# Patient Record
Sex: MALE | HISPANIC OR LATINO | Employment: FULL TIME | URBAN - METROPOLITAN AREA
[De-identification: names, ages, dates, MRNs, and addresses within clinical notes are randomized per-mention and may not be internally consistent; named-entity substitution may affect disease eponyms.]

---

## 2021-05-13 ENCOUNTER — TELEPHONE (OUTPATIENT)
Dept: SCHEDULING | Facility: IMAGING CENTER | Age: 26
End: 2021-05-13

## 2021-05-28 ENCOUNTER — OFFICE VISIT (OUTPATIENT)
Dept: MEDICAL GROUP | Facility: PHYSICIAN GROUP | Age: 26
End: 2021-05-28
Payer: COMMERCIAL

## 2021-05-28 VITALS
DIASTOLIC BLOOD PRESSURE: 76 MMHG | TEMPERATURE: 97.5 F | WEIGHT: 300 LBS | HEART RATE: 89 BPM | BODY MASS INDEX: 42.95 KG/M2 | OXYGEN SATURATION: 97 % | SYSTOLIC BLOOD PRESSURE: 120 MMHG | HEIGHT: 70 IN | RESPIRATION RATE: 12 BRPM

## 2021-05-28 DIAGNOSIS — K40.90 HERNIA OF TESTICLE: ICD-10-CM

## 2021-05-28 DIAGNOSIS — Z13.29 SCREENING FOR ENDOCRINE DISORDER: ICD-10-CM

## 2021-05-28 DIAGNOSIS — Z13.6 SCREENING FOR CARDIOVASCULAR CONDITION: ICD-10-CM

## 2021-05-28 DIAGNOSIS — Z72.0 TOBACCO USE: ICD-10-CM

## 2021-05-28 DIAGNOSIS — Z13.0 SCREENING FOR DEFICIENCY ANEMIA: ICD-10-CM

## 2021-05-28 DIAGNOSIS — N48.9 PENILE LESION: ICD-10-CM

## 2021-05-28 PROCEDURE — 99203 OFFICE O/P NEW LOW 30 MIN: CPT | Performed by: PHYSICIAN ASSISTANT

## 2021-05-28 ASSESSMENT — PATIENT HEALTH QUESTIONNAIRE - PHQ9: CLINICAL INTERPRETATION OF PHQ2 SCORE: 0

## 2021-05-28 NOTE — ASSESSMENT & PLAN NOTE
Repair 4-5 years ago with mesh in [place]. About 4-5 months ago he started to feel pressure and as if there is bulge there again. No complication post surgery initially. No pain. No discoloration of testicular area. Right testicle affected. No other urination issues. No penile lesions or discharge. Normal BM's as well.

## 2021-05-28 NOTE — ASSESSMENT & PLAN NOTE
White lesion on penis- has been there 3-4 months. Sometimes hurts. No discharge from it. No history of STI that he is aware of, could of had exposure 3-4 months. No penile discharge. Located on the top of the penis.

## 2021-05-28 NOTE — PROGRESS NOTES
CC:   Chief Complaint   Patient presents with   • Establish Care   • Testicular Pain     Penile lesion          HISTORY OF PRESENT ILLNESS: Patient is a 25 y.o. male established patient who presents today to establish care with me and discuss the following issues:      Health Maintenance: Completed  Doesn't recall getting chickenpox vaccine, never had chickenpox.     Hernia of testicle  Repair 4-5 years ago with mesh in [place]. About 4-5 months ago he started to feel pressure and as if there is bulge there again. No complication post surgery initially. No pain. No discoloration of testicular area. Right testicle affected. No other urination issues. No penile lesions or discharge. Normal BM's as well.     Penile lesion  White lesion on penis- has been there 3-4 months. Sometimes hurts. No discharge from it. No history of STI that he is aware of, could of had exposure 3-4 months. No penile discharge. Located on the top of the penis.     Tobacco use  Smokes socially- not daily. Has done this for about 2 years.       Patient Active Problem List    Diagnosis Date Noted   • Hernia of testicle 05/28/2021   • Penile lesion 05/28/2021   • Tobacco use 05/28/2021      Allergies:Patient has no known allergies.    No current outpatient medications on file.     No current facility-administered medications for this visit.       Social History     Tobacco Use   • Smoking status: Current Some Day Smoker   • Smokeless tobacco: Never Used   Vaping Use   • Vaping Use: Never used   Substance Use Topics   • Alcohol use: Yes   • Drug use: Never     Social History     Social History Narrative   • Not on file       History reviewed. No pertinent family history.    Review of Systems:    Constitutional: No Fevers, Chills  Eyes: No vision changes  ENT: No hearing changes  Resp: No Shortness of breath  CV: No Chest pain  GI: No Nausea/Vomiting  MSK: No weakness  Skin: No rashes  Neuro: No Headaches  Psych: No Suicidal ideations    All remaining  "systems reviewed and found to be negative, except as stated above.    Exam:    /76   Pulse 89   Temp 36.4 °C (97.5 °F) (Temporal)   Resp 12   Ht 1.778 m (5' 10\")   Wt (!) 136 kg (300 lb)   SpO2 97%  Body mass index is 43.05 kg/m².    General:  Well nourished, well developed male in NAD  HENT: Atraumatic, normocephalic  EYES: Extraocular movements intact  NECK: Supple, FROM  CHEST: No deformities, Equal chest expansion  RESP: Unlabored, no stridor or audible wheeze  HEART: Regular Rate and rhythm.   ABD: Soft, Nontender, Non-Distended  : After receiving consent from patient performed  exam.  On physical exam, unable to palpate any bulge within the testicular region on the right side.  No discoloration, nontender to palpation.  Unable to visualize penile lesion as well.  Patient is uncircumscribed, retracted foreskin, still unable to visualize any lesions.  Extremities: No Clubbing, Cyanosis, or Edema  Skin: Warm/dry, without rashes  Neuro: A/O x 4, due to COVID-19- did not have patient remove face mask to test cranial nerves.  Motor/sensory grossly intact  Psych: Normal behavior, normal affect      Lab review:  Labs are reviewed and discussed with a patient  Lab Results   Component Value Date/Time    CHOLSTRLTOT 166 01/13/2015 09:19 AM    LDL 98 01/13/2015 09:19 AM    HDL 39 (A) 01/13/2015 09:19 AM    TRIGLYCERIDE 145 01/13/2015 09:19 AM       Lab Results   Component Value Date/Time    SODIUM 137 01/13/2015 09:19 AM    POTASSIUM 4.1 01/13/2015 09:19 AM    CHLORIDE 104 01/13/2015 09:19 AM    CO2 28 01/13/2015 09:19 AM    GLUCOSE 90 01/13/2015 09:19 AM    BUN 18 01/13/2015 09:19 AM    CREATININE 0.92 01/13/2015 09:19 AM     No results found for: ALKPHOSPHAT, ASTSGOT, ALTSGPT, TBILIRUBIN   No results found for: HBA1C  No results found for: TSH  No results found for: FREET4    No results found for: WBC, RBC, HEMOGLOBIN, HEMATOCRIT, MCV, MCH, MCHC, MPV, NEUTSPOLYS, LYMPHOCYTES, MONOCYTES, EOSINOPHILS, " BASOPHILS, HYPOCHROMIA, ANISOCYTOSIS       Assessment/Plan:  1. Hernia of testicle  - [id number]; Future  History of hernia repair, right testicular region per patient.  Done in [place] 4 to 5 years ago.  He feels like the hernia is reoccurring.  Will order ultrasound for further evaluation.  No significant findings on physical exam, nontender to palpation.    2. Screening for endocrine disorder  - TSH; Future  - VITAMIN D,25 HYDROXY; Future    3. Screening for deficiency anemia  - CBC WITHOUT DIFFERENTIAL; Future    4. Screening for cardiovascular condition  - Comp Metabolic Panel; Future  - Lipid Profile; Future    5. Penile lesion  - Chlamydia/GC PCR Urine Or Swab; Future  - HEPATITIS PANEL ACUTE(4 COMPONENTS); Future  - HIV AG/AB COMBO ASSAY SCREENING; Future  - HSV 1/2 IGG W/ TYPE SPECIFIC RFLX; Future  - T.PALLIDUM AB EIA; Future  - URINALYSIS,CULTURE IF INDICATED; Future  Possible exposure to STI few months ago.  Patient complains of a penile lesion over the top of his penis, again unable to visualize on exam, however we will proceed with screening for STIs.  Denies any penile discharge.  Has been there constantly for the last 3 to 4 months.  Does not come and go.  Sometimes it is tender.    6. Tobacco use  Patient is a social smoker has been for the last few years.  Recommend smoking cessation.    Follow-up: Return in about 4 weeks (around 6/25/2021) for Follow up on labs and imaging.    Please note that this dictation was created using voice recognition software. I have made every reasonable attempt to correct obvious errors, but I expect that there are errors of grammar and possibly content that I did not discover before finalizing the note.

## 2021-06-25 ENCOUNTER — OFFICE VISIT (OUTPATIENT)
Dept: MEDICAL GROUP | Facility: PHYSICIAN GROUP | Age: 26
End: 2021-06-25
Payer: COMMERCIAL

## 2021-06-25 VITALS
DIASTOLIC BLOOD PRESSURE: 70 MMHG | BODY MASS INDEX: 41.52 KG/M2 | SYSTOLIC BLOOD PRESSURE: 122 MMHG | HEIGHT: 70 IN | WEIGHT: 290 LBS | RESPIRATION RATE: 14 BRPM | TEMPERATURE: 97.7 F | HEART RATE: 88 BPM | OXYGEN SATURATION: 99 %

## 2021-06-25 DIAGNOSIS — K40.90 HERNIA OF TESTICLE: ICD-10-CM

## 2021-06-25 PROCEDURE — 99212 OFFICE O/P EST SF 10 MIN: CPT | Performed by: PHYSICIAN ASSISTANT

## 2021-06-25 NOTE — PROGRESS NOTES
"CC:   Chief Complaint   Patient presents with   • Lab Results     pending labs results and imaging results.           HISTORY OF PRESENT ILLNESS: Patient is a 25 y.o. male established patient who presents today to follow up on the following conditions:       Health Maintenance: Completed    Hernia of testicle  Patient has a scrotal ultrasound scheduled for next Monday.  Patient still needs to get his labs done.  No changes in symptoms since her last visit.      Patient Active Problem List    Diagnosis Date Noted   • Hernia of testicle 05/28/2021   • Penile lesion 05/28/2021   • Tobacco use 05/28/2021      Allergies:Patient has no known allergies.    No current outpatient medications on file.     No current facility-administered medications for this visit.       Social History     Tobacco Use   • Smoking status: Current Some Day Smoker   • Smokeless tobacco: Never Used   Vaping Use   • Vaping Use: Never used   Substance Use Topics   • Alcohol use: Yes   • Drug use: Never     Social History     Social History Narrative   • Not on file       History reviewed. No pertinent family history.    Review of Systems:    Constitutional: No Fevers, Chills  Eyes: No vision changes  ENT: No hearing changes  Resp: No Shortness of breath  CV: No Chest pain  GI: No Nausea/Vomiting  MSK: No weakness  Skin: No rashes  Neuro: No Headaches  Psych: No Suicidal ideations    All remaining systems reviewed and found to be negative, except as stated above.    Exam:    /70   Pulse 88   Temp 36.5 °C (97.7 °F) (Temporal)   Resp 14   Ht 1.778 m (5' 10\")   Wt (!) 132 kg (290 lb)   SpO2 99%  Body mass index is 41.61 kg/m².    General:  Well nourished, well developed male in NAD  HENT: Atraumatic, normocephalic  EYES: Extraocular movements intact  NECK: Supple, FROM  CHEST: No deformities, Equal chest expansion  RESP: Unlabored, no stridor or audible wheeze  HEART: Regular Rate and rhythm.   Extremities: No Clubbing, Cyanosis, or " Edema  Skin: Warm/dry, without rashes  Neuro: A/O x 4, due to COVID-19- did not have patient remove face mask to test cranial nerves.  Motor/sensory grossly intact  Psych: Normal behavior, normal affect        Assessment/Plan:  1. Hernia of testicle  Encourage patient to proceed with his lab work-up in addition to his ultrasound that is scheduled on Monday.  After getting results we will schedule follow-up to discuss them in detail and create a plan.    Follow-up: Return in about 4 weeks (around 7/23/2021) for Follow up on labs and imaging.    Please note that this dictation was created using voice recognition software. I have made every reasonable attempt to correct obvious errors, but I expect that there are errors of grammar and possibly content that I did not discover before finalizing the note.

## 2021-06-25 NOTE — ASSESSMENT & PLAN NOTE
Patient has a scrotal ultrasound scheduled for next Monday.  Patient still needs to get his labs done.  No changes in symptoms since her last visit.

## 2021-06-25 NOTE — PATIENT INSTRUCTIONS
Please get labs, check with your insurance on where you can go. [facility], [facility] and [facility] are common options.     Please fast 8-10 hours before you get your blood work. You can drink water.

## 2021-06-28 ENCOUNTER — APPOINTMENT (OUTPATIENT)
Dept: RADIOLOGY | Facility: MEDICAL CENTER | Age: 26
End: 2021-06-28
Attending: PHYSICIAN ASSISTANT
Payer: COMMERCIAL

## 2021-07-09 ENCOUNTER — HOSPITAL ENCOUNTER (OUTPATIENT)
Dept: RADIOLOGY | Facility: MEDICAL CENTER | Age: 26
End: 2021-07-09
Attending: PHYSICIAN ASSISTANT
Payer: COMMERCIAL

## 2021-07-09 DIAGNOSIS — K40.90 HERNIA OF TESTICLE: ICD-10-CM

## 2021-07-09 PROCEDURE — 76857 US EXAM PELVIC LIMITED: CPT

## 2021-11-01 ENCOUNTER — APPOINTMENT (OUTPATIENT)
Dept: RADIOLOGY | Facility: MEDICAL CENTER | Age: 26
End: 2021-11-01
Attending: EMERGENCY MEDICINE
Payer: COMMERCIAL

## 2021-11-01 ENCOUNTER — HOSPITAL ENCOUNTER (EMERGENCY)
Facility: MEDICAL CENTER | Age: 26
End: 2021-11-01
Attending: EMERGENCY MEDICINE
Payer: COMMERCIAL

## 2021-11-01 VITALS
WEIGHT: 304.24 LBS | DIASTOLIC BLOOD PRESSURE: 80 MMHG | BODY MASS INDEX: 42.59 KG/M2 | RESPIRATION RATE: 16 BRPM | OXYGEN SATURATION: 96 % | TEMPERATURE: 98.4 F | HEART RATE: 91 BPM | SYSTOLIC BLOOD PRESSURE: 133 MMHG | HEIGHT: 71 IN

## 2021-11-01 DIAGNOSIS — S99.912A INJURY OF LEFT ANKLE, INITIAL ENCOUNTER: ICD-10-CM

## 2021-11-01 PROCEDURE — 99283 EMERGENCY DEPT VISIT LOW MDM: CPT

## 2021-11-01 PROCEDURE — 73610 X-RAY EXAM OF ANKLE: CPT | Mod: LT

## 2021-11-02 NOTE — ED PROVIDER NOTES
"ED Provider Note    CHIEF COMPLAINT  Chief Complaint   Patient presents with   • Ankle Pain     Reports L ankle pain- states \"about a week ago I sprained my ankle playing soccer\". Ambulates with steady gait. CMS intact.         HPI    Primary care provider: Cinthia Paredes P.A.-C.   History obtained from: Patient  History limited by: None     Yohannes Lewis is a 25 y.o. male who presents to the ED complaining of left ankle pain and swelling after he injured it playing soccer a week ago.  Patient states that he twisted his left ankle but unsure of the exact mechanism or direction of injury.  He subsequently developed swelling and pain that has been persistent since.  He denies any other injuries including head injury or loss of consciousness.  He denies pain anywhere else.  He denies any weakness or sensory change.  He denies any past medical problems.    REVIEW OF SYSTEMS  Please see HPI for pertinent positives/negatives.     PAST MEDICAL HISTORY  Past Medical History:   Diagnosis Date   • Patient denies medical problems         SURGICAL HISTORY  Past Surgical History:   Procedure Laterality Date   • HERNIA REPAIR          SOCIAL HISTORY  Social History     Tobacco Use   • Smoking status: Current Some Day Smoker   • Smokeless tobacco: Never Used   Vaping Use   • Vaping Use: Never used   Substance and Sexual Activity   • Alcohol use: Yes     Comment: reports approx 3-4 beers/mo   • Drug use: Never   • Sexual activity: Not on file        FAMILY HISTORY  History reviewed. No pertinent family history.     CURRENT MEDICATIONS  Home Medications    **Home medications have not yet been reviewed for this encounter**          ALLERGIES  No Known Allergies     PHYSICAL EXAM  VITAL SIGNS: /80   Pulse 91   Temp 36.9 °C (98.4 °F) (Temporal)   Resp 16   Ht 1.803 m (5' 11\")   Wt (!) 138 kg (304 lb 3.8 oz)   SpO2 96%   BMI 42.43 kg/m²  @SANGEETHA[[id number]::@     Pulse ox interpretation: 97% I interpret this pulse ox as " normal     Constitutional: Well developed, well nourished, alert in no apparent distress, nontoxic appearance   HENT: No external signs of trauma, normocephalic   Eyes: No discharge, no icterus   Neck: No stridor   Cardiovascular: Strong distal pulses and good perfusion   Thorax & Lungs: No respiratory distress   Extremities: No cyanosis, diffuse swelling around the left ankle and distal portion of left lower leg with tenderness to palpation and limited range of motion due to pain, limited stress testing due to discomfort and patient guarding, sensation intact to touch throughout, distal 5/5 strength, no gross deformity, intact distal pulses with brisk cap refill   Skin: Warm, dry, no pallor/cyanosis, no rash noted except for mild ecchymosis around the medial portion of left ankle  Neuro: A/O times 3, no focal deficits noted   Psychiatric: Cooperative, normal mood and affect, normal judgement, appropriate for clinical situation        DIAGNOSTIC STUDIES / PROCEDURES        LABS  All labs reviewed by me.     Results for orders placed or performed in visit on 03/20/15   POCT Breath Alcohol Test   Result Value Ref Range    POC Breathalizer 0.000 0.00 - 0.01 Percent    Breath Alcohol Comment          RADIOLOGY  The radiologist's interpretation of all radiological studies have been reviewed by me.     DX-ANKLE 3+ VIEWS LEFT   Final Result      Achilles tendinopathy and enthesopathy      No radiographic evidence of acute or subacute displaced fracture             COURSE & MEDICAL DECISION MAKING  Nursing notes, VS, PMSFHx reviewed in chart.     Review of past medical records shows the patient was last seen in the office on June 25, 2021 for possible hernia of testicle.      Differential diagnoses considered include but are not limited to: Fx, dislocation, subluxation, contusion, strain, sprain, bursitis, tendonitis      History and physical exam as above.  X-rays with findings as above.  I discussed the findings with the  patient.  This is a very pleasant patient in no acute distress and nontoxic in appearance.  I discussed with him possibility of ligament or tendon injury and he will be given outpatient referral to orthopedics for follow-up.  He was advised on ice, elevation and using acetaminophen/ibuprofen as needed.  No evidence for significant neurovascular injury or compartment syndrome.  Return to ED precautions were given.  Patient was given a walking boot to use as needed for support and comfort.  Patient also noted to have elevated blood pressure readings for which he can follow-up on outpatient basis for further management.  He verbalized understanding and agreed with plan of care with no further questions or concerns.      The patient is referred to a primary physician for blood pressure management, diabetic screening, and for all other preventative health concerns.       FINAL IMPRESSION  1. Injury of left ankle, initial encounter Acute          DISPOSITION  Patient will be discharged home in stable condition.       FOLLOW UP  Kenney Palmer M.D.  [address] [place] [address]  [phone]    Call in 1 day      [facility], Emergency Dept  [address]  [facility] [id number] [phone]    If symptoms worsen         OUTPATIENT MEDICATIONS  There are no discharge medications for this patient.         Electronically signed by: Chacorta Hair D.O., 11/1/2021 9:49 PM      Portions of this record were made with voice recognition software.  Despite my review, spelling/grammar/context errors may still remain.  Interpretation of this chart should be taken in this context.

## 2021-11-09 PROBLEM — S86.012A ACHILLES RUPTURE, LEFT: Status: ACTIVE | Noted: 2021-11-09

## 2021-11-09 NOTE — H&P (VIEW-ONLY)
Subjective   Patient ID:  Yohannes Lewis is a 25 y.o. male.    Chief Complaint:    Chief Complaint   Patient presents with   • Left Ankle - Follow-Up    Follow-Up of the Left Ankle    Last Surgery: No surgery found on No surgery found    HPI  Yohannes Lewis is a new patient to me present with a new problem to his left ankle. He reports that he was playing soccer 2 weeks ago when he twisted his ankle and he felt as though someone kicked him. He felt immediate pain and has significant swelling. He was seen at [facility] and given a walking boot. He is still having pain. He comes into normal shoes today. He reports the pain radiates from his ankle up his calf.    ROS  Constitutional: Negative for fever, chills/sweats   Respiratory: Negative for shortness of breath, BLANCHARD  Cardiovascular: Negative for chest pain or pressure  GI/: Negative for diarrhea/constipation / urinary difficulty  All other systems reviewed and are negative except as per HPI.     Objective   Ortho Exam  Significant swelling throughout the ankle up to the calf. There is also significantly ecchymosis up his calf. Ambulates with an antalgic gait. Tender at the Achilles insertion and about his calf muscle.    Last Imaging Result(s):   DX-ANKLE 3+ VIEWS LEFT    Result Date: 11/09/2021  X-rays: 3 left ankle weightbearing images, taken today, were reviewed which demonstrates califciation on the posterior ankle.      Assessment & Plan   Encounter Diagnoses:   Achilles rupture, left, initial encounter    No orders of the defined types were placed in this encounter.    He was given a tall fracture boot today in clinic.   I had a thorough discussion with the patient regarding the diagnosis including both operative and nonoperative treatment.  After obtaining consent from the patient, we will proceed with operative management. I recommended a left Achilles rupture repair.   Risks of surgery include, but not limited to, wound problems, infection,  nerve injury, vascular injury, need for further surgery. There is also risk for weakness, stiffness, blood clots, recurrence of any deformity and chance for reinjury. I discussed the risks/ benefits/ complications associated with narcotic use including the potential for addiction. All of the patient's questions were answered today.  We will schedule this in the near future/at his convenience. I will follow up with him postoperatively    Procedures     I, Niki Salgado, am scribing for, and in the presence of Zach Guerrero MD.    I, Zach Guerrero MD, personally performed the services described in this documentation, as scribed by, Niki Salgado, in my presence. I do hereby attest this information is true, accurate, and complete to the best of my knowledge.

## 2021-11-11 ENCOUNTER — PRE-ADMISSION TESTING (OUTPATIENT)
Dept: ADMISSIONS | Facility: MEDICAL CENTER | Age: 26
End: 2021-11-11
Attending: ORTHOPAEDIC SURGERY
Payer: COMMERCIAL

## 2021-11-11 DIAGNOSIS — Z01.812 PRE-OPERATIVE LABORATORY EXAMINATION: ICD-10-CM

## 2021-11-11 PROCEDURE — U0005 INFEC AGEN DETEC AMPLI PROBE: HCPCS

## 2021-11-11 PROCEDURE — C9803 HOPD COVID-19 SPEC COLLECT: HCPCS

## 2021-11-11 PROCEDURE — U0003 INFECTIOUS AGENT DETECTION BY NUCLEIC ACID (DNA OR RNA); SEVERE ACUTE RESPIRATORY SYNDROME CORONAVIRUS 2 (SARS-COV-2) (CORONAVIRUS DISEASE [COVID-19]), AMPLIFIED PROBE TECHNIQUE, MAKING USE OF HIGH THROUGHPUT TECHNOLOGIES AS DESCRIBED BY CMS-2020-01-R: HCPCS

## 2021-11-11 NOTE — DISCHARGE PLANNING
" - TOTAL JOINT    Procedure: Procedure(s):  ACHILLES REPAIR  Procedure Date: 11/15/2021  Insurance: Payor: SAM / Plan: [place] BCBS / Product Type: *No Product type* /    Equipment currently available at home?  none  Steps into the home? 2  Steps within the home? 0  Toilet height? Standard  Type of shower? tub-shower  Who will be with you during your recovery? Sister       Plan:   Met with pt during preadmission appointment. Pt states he does not know exactly how long he will be [facility], but \"the doctor said I would be out for 3 months,\"  Recommended toilet seat riser, crutches, knee  and tub transfer bench. Pt informed of [facility] chest and encouraged to call them for equipment. Home check list and equipment list given and reviewed with pt. All questions and concerns addressed.         "

## 2021-11-12 LAB
SARS-COV-2 RNA RESP QL NAA+PROBE: NOTDETECTED
SPECIMEN SOURCE: NORMAL

## 2021-11-12 NOTE — OR NURSING
Informed both Dr. Guerrero's Clinical Manager Pascale and his MA Mayelin today that patient stated he will be starting ASA 81mg BID prior to his upcoming surgery on 11- and that patient stated this was per Dr. Guerrero's instructions.

## 2021-11-15 ENCOUNTER — ANESTHESIA EVENT (OUTPATIENT)
Dept: SURGERY | Facility: MEDICAL CENTER | Age: 26
End: 2021-11-15
Payer: COMMERCIAL

## 2021-11-15 ENCOUNTER — HOSPITAL ENCOUNTER (OUTPATIENT)
Facility: MEDICAL CENTER | Age: 26
End: 2021-11-15
Attending: ORTHOPAEDIC SURGERY | Admitting: ORTHOPAEDIC SURGERY
Payer: COMMERCIAL

## 2021-11-15 ENCOUNTER — APPOINTMENT (OUTPATIENT)
Dept: RADIOLOGY | Facility: MEDICAL CENTER | Age: 26
End: 2021-11-15
Attending: ORTHOPAEDIC SURGERY
Payer: COMMERCIAL

## 2021-11-15 ENCOUNTER — ANESTHESIA (OUTPATIENT)
Dept: SURGERY | Facility: MEDICAL CENTER | Age: 26
End: 2021-11-15
Payer: COMMERCIAL

## 2021-11-15 VITALS
WEIGHT: 298.5 LBS | DIASTOLIC BLOOD PRESSURE: 57 MMHG | HEIGHT: 71 IN | RESPIRATION RATE: 16 BRPM | SYSTOLIC BLOOD PRESSURE: 155 MMHG | HEART RATE: 89 BPM | OXYGEN SATURATION: 92 % | TEMPERATURE: 97.8 F | BODY MASS INDEX: 41.79 KG/M2

## 2021-11-15 PROCEDURE — 160002 HCHG RECOVERY MINUTES (STAT): Performed by: ORTHOPAEDIC SURGERY

## 2021-11-15 PROCEDURE — A9270 NON-COVERED ITEM OR SERVICE: HCPCS | Performed by: ANESTHESIOLOGY

## 2021-11-15 PROCEDURE — 501838 HCHG SUTURE GENERAL: Performed by: ORTHOPAEDIC SURGERY

## 2021-11-15 PROCEDURE — 160036 HCHG PACU - EA ADDL 30 MINS PHASE I: Performed by: ORTHOPAEDIC SURGERY

## 2021-11-15 PROCEDURE — 160039 HCHG SURGERY MINUTES - EA ADDL 1 MIN LEVEL 3: Performed by: ORTHOPAEDIC SURGERY

## 2021-11-15 PROCEDURE — 503018 HCHG SUTURE,FIBERTAPE #2: Performed by: ORTHOPAEDIC SURGERY

## 2021-11-15 PROCEDURE — 700101 HCHG RX REV CODE 250: Performed by: ANESTHESIOLOGY

## 2021-11-15 PROCEDURE — 700102 HCHG RX REV CODE 250 W/ 637 OVERRIDE(OP): Performed by: ANESTHESIOLOGY

## 2021-11-15 PROCEDURE — 160028 HCHG SURGERY MINUTES - 1ST 30 MINS LEVEL 3: Performed by: ORTHOPAEDIC SURGERY

## 2021-11-15 PROCEDURE — 700111 HCHG RX REV CODE 636 W/ 250 OVERRIDE (IP): Performed by: ANESTHESIOLOGY

## 2021-11-15 PROCEDURE — 27650 REPAIR ACHILLES TENDON: CPT | Mod: LT | Performed by: ORTHOPAEDIC SURGERY

## 2021-11-15 PROCEDURE — 160009 HCHG ANES TIME/MIN: Performed by: ORTHOPAEDIC SURGERY

## 2021-11-15 PROCEDURE — A6454 SELF-ADHER BAND W>=3" <5"/YD: HCPCS | Performed by: ORTHOPAEDIC SURGERY

## 2021-11-15 PROCEDURE — 700101 HCHG RX REV CODE 250: Performed by: ORTHOPAEDIC SURGERY

## 2021-11-15 PROCEDURE — 27650 REPAIR ACHILLES TENDON: CPT | Mod: 80ROC,LT | Performed by: STUDENT IN AN ORGANIZED HEALTH CARE EDUCATION/TRAINING PROGRAM

## 2021-11-15 PROCEDURE — 160048 HCHG OR STATISTICAL LEVEL 1-5: Performed by: ORTHOPAEDIC SURGERY

## 2021-11-15 PROCEDURE — A6223 GAUZE >16<=48 NO W/SAL W/O B: HCPCS | Performed by: ORTHOPAEDIC SURGERY

## 2021-11-15 PROCEDURE — 500881 HCHG PACK, EXTREMITY: Performed by: ORTHOPAEDIC SURGERY

## 2021-11-15 PROCEDURE — 700105 HCHG RX REV CODE 258: Performed by: ORTHOPAEDIC SURGERY

## 2021-11-15 PROCEDURE — 64445 NJX AA&/STRD SCIATIC NRV IMG: CPT | Performed by: ORTHOPAEDIC SURGERY

## 2021-11-15 PROCEDURE — 160035 HCHG PACU - 1ST 60 MINS PHASE I: Performed by: ORTHOPAEDIC SURGERY

## 2021-11-15 RX ORDER — DEXMEDETOMIDINE HYDROCHLORIDE 100 UG/ML
INJECTION, SOLUTION INTRAVENOUS PRN
Status: DISCONTINUED | OUTPATIENT
Start: 2021-11-15 | End: 2021-11-15 | Stop reason: SURG

## 2021-11-15 RX ORDER — CEFAZOLIN SODIUM 1 G/3ML
INJECTION, POWDER, FOR SOLUTION INTRAMUSCULAR; INTRAVENOUS PRN
Status: DISCONTINUED | OUTPATIENT
Start: 2021-11-15 | End: 2021-11-15 | Stop reason: SURG

## 2021-11-15 RX ORDER — HYDROMORPHONE HYDROCHLORIDE 1 MG/ML
0.2 INJECTION, SOLUTION INTRAMUSCULAR; INTRAVENOUS; SUBCUTANEOUS
Status: DISCONTINUED | OUTPATIENT
Start: 2021-11-15 | End: 2021-11-16 | Stop reason: HOSPADM

## 2021-11-15 RX ORDER — GABAPENTIN 300 MG/1
300 CAPSULE ORAL ONCE
Status: DISCONTINUED | OUTPATIENT
Start: 2021-11-15 | End: 2021-11-15 | Stop reason: HOSPADM

## 2021-11-15 RX ORDER — SODIUM CHLORIDE, SODIUM LACTATE, POTASSIUM CHLORIDE, CALCIUM CHLORIDE 600; 310; 30; 20 MG/100ML; MG/100ML; MG/100ML; MG/100ML
INJECTION, SOLUTION INTRAVENOUS CONTINUOUS
Status: ACTIVE | OUTPATIENT
Start: 2021-11-15 | End: 2021-11-15

## 2021-11-15 RX ORDER — ONDANSETRON 2 MG/ML
INJECTION INTRAMUSCULAR; INTRAVENOUS PRN
Status: DISCONTINUED | OUTPATIENT
Start: 2021-11-15 | End: 2021-11-15 | Stop reason: SURG

## 2021-11-15 RX ORDER — MEPERIDINE HYDROCHLORIDE 25 MG/ML
12.5 INJECTION INTRAMUSCULAR; INTRAVENOUS; SUBCUTANEOUS
Status: DISCONTINUED | OUTPATIENT
Start: 2021-11-15 | End: 2021-11-16 | Stop reason: HOSPADM

## 2021-11-15 RX ORDER — ACETAMINOPHEN 500 MG
1000 TABLET ORAL ONCE
Status: DISCONTINUED | OUTPATIENT
Start: 2021-11-15 | End: 2021-11-15 | Stop reason: HOSPADM

## 2021-11-15 RX ORDER — MIDAZOLAM HYDROCHLORIDE 1 MG/ML
INJECTION INTRAMUSCULAR; INTRAVENOUS PRN
Status: DISCONTINUED | OUTPATIENT
Start: 2021-11-15 | End: 2021-11-15 | Stop reason: SURG

## 2021-11-15 RX ORDER — SODIUM CHLORIDE, SODIUM LACTATE, POTASSIUM CHLORIDE, CALCIUM CHLORIDE 600; 310; 30; 20 MG/100ML; MG/100ML; MG/100ML; MG/100ML
INJECTION, SOLUTION INTRAVENOUS CONTINUOUS
Status: DISCONTINUED | OUTPATIENT
Start: 2021-11-15 | End: 2021-11-16 | Stop reason: HOSPADM

## 2021-11-15 RX ORDER — MAGNESIUM HYDROXIDE 1200 MG/15ML
LIQUID ORAL
Status: COMPLETED | OUTPATIENT
Start: 2021-11-15 | End: 2021-11-15

## 2021-11-15 RX ORDER — BUPIVACAINE HYDROCHLORIDE 5 MG/ML
INJECTION, SOLUTION EPIDURAL; INTRACAUDAL
Status: DISCONTINUED | OUTPATIENT
Start: 2021-11-15 | End: 2021-11-15 | Stop reason: HOSPADM

## 2021-11-15 RX ORDER — IPRATROPIUM BROMIDE AND ALBUTEROL SULFATE 2.5; .5 MG/3ML; MG/3ML
3 SOLUTION RESPIRATORY (INHALATION)
Status: DISCONTINUED | OUTPATIENT
Start: 2021-11-15 | End: 2021-11-16 | Stop reason: HOSPADM

## 2021-11-15 RX ORDER — HALOPERIDOL 5 MG/ML
1 INJECTION INTRAMUSCULAR
Status: DISCONTINUED | OUTPATIENT
Start: 2021-11-15 | End: 2021-11-16 | Stop reason: HOSPADM

## 2021-11-15 RX ORDER — DIPHENHYDRAMINE HYDROCHLORIDE 50 MG/ML
12.5 INJECTION INTRAMUSCULAR; INTRAVENOUS
Status: DISCONTINUED | OUTPATIENT
Start: 2021-11-15 | End: 2021-11-16 | Stop reason: HOSPADM

## 2021-11-15 RX ORDER — HYDRALAZINE HYDROCHLORIDE 20 MG/ML
5 INJECTION INTRAMUSCULAR; INTRAVENOUS
Status: DISCONTINUED | OUTPATIENT
Start: 2021-11-15 | End: 2021-11-16 | Stop reason: HOSPADM

## 2021-11-15 RX ORDER — ROCURONIUM BROMIDE 10 MG/ML
INJECTION, SOLUTION INTRAVENOUS PRN
Status: DISCONTINUED | OUTPATIENT
Start: 2021-11-15 | End: 2021-11-15 | Stop reason: SURG

## 2021-11-15 RX ORDER — ONDANSETRON 2 MG/ML
4 INJECTION INTRAMUSCULAR; INTRAVENOUS
Status: COMPLETED | OUTPATIENT
Start: 2021-11-15 | End: 2021-11-15

## 2021-11-15 RX ORDER — OXYCODONE HCL 5 MG/5 ML
10 SOLUTION, ORAL ORAL
Status: COMPLETED | OUTPATIENT
Start: 2021-11-15 | End: 2021-11-15

## 2021-11-15 RX ORDER — HYDROMORPHONE HYDROCHLORIDE 1 MG/ML
0.4 INJECTION, SOLUTION INTRAMUSCULAR; INTRAVENOUS; SUBCUTANEOUS
Status: DISCONTINUED | OUTPATIENT
Start: 2021-11-15 | End: 2021-11-16 | Stop reason: HOSPADM

## 2021-11-15 RX ORDER — OXYCODONE HCL 5 MG/5 ML
5 SOLUTION, ORAL ORAL
Status: COMPLETED | OUTPATIENT
Start: 2021-11-15 | End: 2021-11-15

## 2021-11-15 RX ORDER — CELECOXIB 200 MG/1
400 CAPSULE ORAL ONCE
Status: DISCONTINUED | OUTPATIENT
Start: 2021-11-15 | End: 2021-11-15 | Stop reason: HOSPADM

## 2021-11-15 RX ORDER — LABETALOL HYDROCHLORIDE 5 MG/ML
5 INJECTION, SOLUTION INTRAVENOUS
Status: DISCONTINUED | OUTPATIENT
Start: 2021-11-15 | End: 2021-11-16 | Stop reason: HOSPADM

## 2021-11-15 RX ORDER — DEXAMETHASONE SODIUM PHOSPHATE 4 MG/ML
INJECTION, SOLUTION INTRA-ARTICULAR; INTRALESIONAL; INTRAMUSCULAR; INTRAVENOUS; SOFT TISSUE PRN
Status: DISCONTINUED | OUTPATIENT
Start: 2021-11-15 | End: 2021-11-15 | Stop reason: SURG

## 2021-11-15 RX ORDER — BUPIVACAINE HYDROCHLORIDE 5 MG/ML
INJECTION, SOLUTION EPIDURAL; INTRACAUDAL PRN
Status: DISCONTINUED | OUTPATIENT
Start: 2021-11-15 | End: 2021-11-15 | Stop reason: SURG

## 2021-11-15 RX ORDER — LIDOCAINE HYDROCHLORIDE 20 MG/ML
INJECTION, SOLUTION EPIDURAL; INFILTRATION; INTRACAUDAL; PERINEURAL PRN
Status: DISCONTINUED | OUTPATIENT
Start: 2021-11-15 | End: 2021-11-15 | Stop reason: SURG

## 2021-11-15 RX ORDER — HYDROMORPHONE HYDROCHLORIDE 1 MG/ML
0.1 INJECTION, SOLUTION INTRAMUSCULAR; INTRAVENOUS; SUBCUTANEOUS
Status: DISCONTINUED | OUTPATIENT
Start: 2021-11-15 | End: 2021-11-16 | Stop reason: HOSPADM

## 2021-11-15 RX ORDER — CEFAZOLIN SODIUM 1 G/3ML
3 INJECTION, POWDER, FOR SOLUTION INTRAMUSCULAR; INTRAVENOUS ONCE
Status: DISCONTINUED | OUTPATIENT
Start: 2021-11-15 | End: 2021-11-15 | Stop reason: HOSPADM

## 2021-11-15 RX ADMIN — LIDOCAINE HYDROCHLORIDE 80 MG: 20 INJECTION, SOLUTION EPIDURAL; INFILTRATION; INTRACAUDAL at 20:31

## 2021-11-15 RX ADMIN — ONDANSETRON 4 MG: 2 INJECTION INTRAMUSCULAR; INTRAVENOUS at 22:25

## 2021-11-15 RX ADMIN — FENTANYL CITRATE 50 MCG: 50 INJECTION, SOLUTION INTRAMUSCULAR; INTRAVENOUS at 21:38

## 2021-11-15 RX ADMIN — SUGAMMADEX 200 MG: 100 INJECTION, SOLUTION INTRAVENOUS at 21:12

## 2021-11-15 RX ADMIN — PROPOFOL 200 MG: 10 INJECTION, EMULSION INTRAVENOUS at 20:31

## 2021-11-15 RX ADMIN — LIDOCAINE HYDROCHLORIDE 0.5 ML: 10 INJECTION, SOLUTION EPIDURAL; INFILTRATION; INTRACAUDAL; PERINEURAL at 17:17

## 2021-11-15 RX ADMIN — CEFAZOLIN 3 G: 330 INJECTION, POWDER, FOR SOLUTION INTRAMUSCULAR; INTRAVENOUS at 20:31

## 2021-11-15 RX ADMIN — SODIUM CHLORIDE, POTASSIUM CHLORIDE, SODIUM LACTATE AND CALCIUM CHLORIDE: 600; 310; 30; 20 INJECTION, SOLUTION INTRAVENOUS at 17:17

## 2021-11-15 RX ADMIN — MIDAZOLAM HYDROCHLORIDE 2 MG: 1 INJECTION, SOLUTION INTRAMUSCULAR; INTRAVENOUS at 20:29

## 2021-11-15 RX ADMIN — DEXMEDETOMIDINE 20 MCG: 200 INJECTION, SOLUTION INTRAVENOUS at 20:38

## 2021-11-15 RX ADMIN — DEXAMETHASONE SODIUM PHOSPHATE 8 MG: 4 INJECTION, SOLUTION INTRA-ARTICULAR; INTRALESIONAL; INTRAMUSCULAR; INTRAVENOUS; SOFT TISSUE at 20:40

## 2021-11-15 RX ADMIN — ROCURONIUM BROMIDE 60 MG: 10 INJECTION, SOLUTION INTRAVENOUS at 20:31

## 2021-11-15 RX ADMIN — HYDROMORPHONE HYDROCHLORIDE 0.2 MG: 1 INJECTION, SOLUTION INTRAMUSCULAR; INTRAVENOUS; SUBCUTANEOUS at 22:07

## 2021-11-15 RX ADMIN — DEXMEDETOMIDINE 20 MCG: 200 INJECTION, SOLUTION INTRAVENOUS at 20:44

## 2021-11-15 RX ADMIN — FENTANYL CITRATE 50 MCG: 50 INJECTION, SOLUTION INTRAMUSCULAR; INTRAVENOUS at 20:30

## 2021-11-15 RX ADMIN — HYDROMORPHONE HYDROCHLORIDE 0.2 MG: 1 INJECTION, SOLUTION INTRAMUSCULAR; INTRAVENOUS; SUBCUTANEOUS at 22:02

## 2021-11-15 RX ADMIN — OXYCODONE HYDROCHLORIDE 10 MG: 5 SOLUTION ORAL at 21:28

## 2021-11-15 RX ADMIN — BUPIVACAINE HYDROCHLORIDE 18 ML: 5 INJECTION, SOLUTION EPIDURAL; INTRACAUDAL; PERINEURAL at 23:03

## 2021-11-15 RX ADMIN — HYDROMORPHONE HYDROCHLORIDE 0.2 MG: 1 INJECTION, SOLUTION INTRAMUSCULAR; INTRAVENOUS; SUBCUTANEOUS at 21:57

## 2021-11-15 RX ADMIN — FENTANYL CITRATE 50 MCG: 50 INJECTION, SOLUTION INTRAMUSCULAR; INTRAVENOUS at 21:30

## 2021-11-15 RX ADMIN — HYDROMORPHONE HYDROCHLORIDE 0.2 MG: 1 INJECTION, SOLUTION INTRAMUSCULAR; INTRAVENOUS; SUBCUTANEOUS at 21:52

## 2021-11-15 RX ADMIN — FENTANYL CITRATE 50 MCG: 50 INJECTION, SOLUTION INTRAMUSCULAR; INTRAVENOUS at 21:20

## 2021-11-15 RX ADMIN — ONDANSETRON 4 MG: 2 INJECTION INTRAMUSCULAR; INTRAVENOUS at 21:20

## 2021-11-15 RX ADMIN — HYDROMORPHONE HYDROCHLORIDE 0.2 MG: 1 INJECTION, SOLUTION INTRAMUSCULAR; INTRAVENOUS; SUBCUTANEOUS at 21:47

## 2021-11-15 ASSESSMENT — PAIN DESCRIPTION - PAIN TYPE
TYPE: SURGICAL PAIN

## 2021-11-15 NOTE — LETTER
November 10, 2021    Patient Name: Yohannes Lewis  Surgeon Name: Zach Guerrero M.D.  Surgery Facility: [facility] ([facility])  Surgery Date: 11/15/2021    The time of your surgery is not final and may change up to and until the day of your surgery. You will be contacted 24-48 hours prior to your surgery date with your check-in and surgery time.    If you will not be at one of the below numbers please call/text the surgery scheduler at [phone]  Preferred Phone: [phone]    BEFORE YOUR SURGERY  Pre Registration and/or Lab Work must be done within and no earlier than 28 days prior to your surgery date. Please call [facility] at [phone] for an appointment as soon as possible.     COVID testing needs to be done 4-7 days prior to surgery, failure to do so can result in surgery being cancelled.    Pre op Appointment:    Instructions: Bring a list of all medications you are taking including the dosing and frequency.    - Your Post-Op Packet and necessary DME will be available for pick-up the day prior to surgery. Please let your provider's MA know at [phone] if you want to pick-up your prescriptions prior to surgery, hand written narcotics must be filled in [place]. If you do not  the prescription prior to surgery you will receive it at surgery.    Please refrain from smoking any substance after midnight prior to surgery. Smoking may interfere with the anesthetic and frequently produces nausea during the recovery period.    Continue taking all lifesaving medications. Including the morning of your surgery with small sip of water.    Please read the MEDICATION INSTRUCTIONS below completely.    DAY OF YOUR SURGERY  Nothing to eat or drink after midnight     Please arrive at the hospital/surgery center at the check-in time provided.     An adult will need to bring you and take you home after your surgery.     AFTER YOUR SURGERY  Post op  Appointment:   Date: 11/30/21   Time: 11:15AM   With: Starr Haji   Location: [address] [place] [address]    TIME OFF WORK  FMLA or Disability forms can be faxed directly to: [phone] or you may drop them off at [address] [place] [facility] [place] [address]. Our office charges a $35.00 fee per form. Forms will be completed within 10 business days of drop off and payment received. For the status of your forms you may contact our disability office directly [phone].    MEDICATION INSTRUCTIONS  The following medications should be stopped a minimum of 10 days prior to surgery:  All over the counter, Supplements & Herbal medications    Anorectics: Phentermine (Adipex-P, Lomaira and Suprenza), Phentermine-topiramate (Qsymia), Bupropion-naltrexone (Contrave)    Opiod Partial Agonists/Opioid Antagonists: Buprenorphine (Subocone, Belbuca, Butrans, Probuphine Implant, Sublocade), Naltrexone (ReVia, Vivitrol), Naloxone    Amphetamines: Dextroamphetamine/Amphetamine (Adderall, Mydayis), Methylphenidate Hydrochloride (Concerta, Metadate, Methylin, Ritalin)    The following medications should be stopped 5 days prior to surgery:  Blood Thinners: Any Aspirin, Aspirin products, anti-inflammatories such as ibuprofen and any blood thinners such as Coumadin and Plavix. Please consult your prescribing physician if you are on life saving blood thinners, in regards to when to stop medications prior to surgery.     The following medications should be stopped a minimum of 3 days prior to surgery:  PDE-5 inhibitors: Sildenafil (Viagra), Tadalafil (Cialis), Vardenafil (Levitra), Avanafil (Stendra)    MAO Inhibitors: Rasagiline (Azilect), Selegiline (Eldepryl, Emsam, Selapar), Isocarboxazid (Marplan), Phenelzine (Nardil)

## 2021-11-16 ASSESSMENT — PAIN SCALES - GENERAL: PAIN_LEVEL: 6

## 2021-11-16 NOTE — ANESTHESIA PREPROCEDURE EVALUATION
Case: [id number] Date/Time: 11/15/21 1745    Procedure: ACHILLES REPAIR (Left )    Diagnosis: Achilles rupture, left [S86.012A]    Pre-op diagnosis: Achilles rupture, left [S86.012A]    Location: [place] [address] / SURGERY [facility]    Surgeons: Zach Guerrero M.D.          Relevant Problems   No relevant active problems       Physical Exam    Airway   Mallampati: II  TM distance: >3 FB  Neck ROM: full       Cardiovascular - normal exam  Rhythm: regular  Rate: normal  (-) murmur     Dental - normal exam           Pulmonary - normal exam  Breath sounds clear to auscultation     Abdominal   (+) obese     Neurological - normal exam                 Anesthesia Plan    ASA 3   ASA physical status 3 criteria: morbid obesity - BMI greater than or equal to 40    Plan - general and peripheral nerve block     Peripheral nerve block will be post-op pain control  Airway plan will be ETT          Induction: intravenous    Postoperative Plan: Postoperative administration of opioids is intended.    Pertinent diagnostic labs and testing reviewed    Informed Consent:    Anesthetic plan and risks discussed with patient.    Use of blood products discussed with: patient whom consented to blood products.

## 2021-11-16 NOTE — DISCHARGE INSTRUCTIONS
Achilles Tendon Tear    The Achilles tendon is a cord-like band that connects the muscles of your lower leg (calf) to your heel. The Achilles tendon is the most common site of tendon tearing (rupture).  What are the causes?  This condition may be caused by:  · Stress from a sudden stretching of the tendon. For example, this may occur when you land from a jump or when your heel drops down into a hole on uneven ground.  · A hard, direct hit to the tendon.  · Pushing off your foot forcefully, such as when sprinting, jumping, or changing direction while running.  What increases the risk?  You are more likely to develop this condition if you:  · Are a runner.  · Play sports that involve sprinting, running, or jumping.  · Play contact sports.  · Have a weak Achilles tendon. Tendons can weaken from aging, repeat injuries, and chronic tendinitis.  · Are male.  · Are 30-55 years of age.  · Take a type of antibiotic medicine called quinolones.  What are the signs or symptoms?  Symptoms of this condition include:  · Hearing a noise, like a pop or a snap, at the time of injury.  · Severe, sudden pain in the back of the ankle.  · Swelling and bruising.  · Inability to actively point your toes down.  · Pain when standing or walking.  · A feeling of giving way when you step on the affected foot.  How is this diagnosed?  This condition is usually diagnosed based on a physical exam.  You may also have imaging tests, such as:  · Ultrasound.  · X-rays.  · MRI.  How is this treated?  This condition may be treated with:  · Rest.  · Ice applied to the area.  · Pain medicine.  · Crutches.  · A cast, splint, or other device to keep the ankle from moving (keep it immobilized).  · Heel wedges to reduce the stretch on your tendon as it heals.  · Surgery. This option may depend on your age and your activity level.  Follow these instructions at home:  Medicines  · Take over-the-counter and prescription medicines only as told by your health care  provider.  · Ask your health care provider if the medicine prescribed to you:  ? Requires you to avoid driving or using heavy machinery.  ? Can cause constipation. You may need to take these actions to prevent or treat constipation:  § Drink enough fluid to keep your urine pale yellow.  § Take over-the-counter or prescription medicines.  § Eat foods that are high in fiber, such as beans, whole grains, and fresh fruits and vegetables.  § Limit foods that are high in fat and processed sugars, such as fried or sweet foods.  If you have a cast:  · Do not stick anything inside the cast to scratch your skin. Doing that increases your risk of infection.  · You may put lotion on dry skin around the edges of the cast. Do not put lotion on the skin underneath it.  If you have a splint or brace:  · Wear it as told by your health care provider. Remove it only as told by your health care provider.  · Loosen it if your toes tingle, become numb, or turn cold and blue.  If you have a cast, splint, or brace:  · Keep it clean and dry.  · Check the skin around it every day. Tell your health care provider about any concerns.  · Ask your health care provider when it is safe to drive if you have it on a leg or foot that you use for driving.  Bathing  · Do not take baths, swim, or use a hot tub until your health care provider approves. Ask your health care provider if you may take showers. You may only be allowed to take sponge baths.  · If the cast, splint, or brace is not waterproof:  ? Do not let it get wet.  ? Cover it with a watertight covering when you take a bath or shower.  Managing pain, stiffness, and swelling    · If directed, put ice on the injured area.  ? If you have a removable splint or brace, remove it as told by your health care provider.  ? Put ice in a plastic bag.  ? Place a towel between your skin and the bag or between your cast and the bag.  ? Leave the ice on for 20 minutes, 2-3 times a day.  · Move your toes  often to avoid stiffness and to lessen swelling.  · Raise (elevate) the injured area above the level of your heart while you are sitting or lying down.  Activity  · Return to your normal activities as told by your health care provider. Ask your health care provider what activities are safe for you.  · Do not use the injured leg to support your body weight until your health care provider says that you can. Use crutches as told by your health care provider.  · Ask your health care provider which exercises are safe for you.  General instructions  · Do not put pressure on any part of a cast or splint until it is fully hardened. This may take several hours.  · Do not use any products that contain nicotine or tobacco, such as cigarettes, e-cigarettes, and chewing tobacco. These can delay healing. If you need help quitting, ask your health care provider.  · Use heel wedges if told by your health care provider.  · Keep all follow-up visits as told by your health care provider. This is important.  How is this prevented?  · Do exercises exactly as told by your health care provider and adjust them as directed.  · Warm up and stretch before being active.  · Cool down and stretch after being active.  · Rest between periods of activity.  · Use equipment that fits you.  Contact a health care provider if you have:  · More pain and swelling.  · Pain that is not controlled with medicines.  · New symptoms.  · Symptoms that get worse.  · Problems moving your toes or foot.  · Warmth in your foot.  · A fever.  Summary  · An Achilles tendon tear is an injury that involves a tear in this tendon.  · This injury typically occurs in runners or athletes who play sports that involve sprinting, running, or jumping.  · An Achilles tendon tear causes sudden severe pain in your ankle and an inability to point your foot down.  · Treatment for this injury may include rest, ice, and pain medicines. In some cases, surgery may be needed.  This  information is not intended to replace advice given to you by your health care provider. Make sure you discuss any questions you have with your health care provider.  Document Released: 12/18/2006 Document Revised: 10/17/2019 Document Reviewed: 07/11/2019  Elsevier Patient Education © 2020 [employer]      Achilles Tendon Tear Repair    The Achilles tendon is a rope-like cord of tissue that connects the lower leg muscles to the heel. Achilles tendon repair is a surgery to repair an Achilles tendon that has been torn (ruptured). During the surgery the torn ends of the tendon are reconnected.  This procedure is typically done in an outpatient surgery center. It usually takes 30 to 60 minutes to complete. The surgery is usually successful, but the recovery period can be long.  Tell a health care provider about:  · Any allergies you have.  · All medicines you are taking, including vitamins, herbs, eye drops, creams, and over-the-counter medicines.  · Any problems you or family members have had with anesthetic medicines.  · Any blood disorders you have.  · Any surgeries you have had.  · Any medical conditions you have, including any skin conditions or infections you develop before surgery.  · Whether you are pregnant or may be pregnant.  What are the risks?  Generally, this is a safe procedure. However, problems may occur, including:  · Infection.  · Bleeding.  · Allergic reaction to medicines or dyes.  · Blood clots.  · Delayed healing.  · Scarring.  · Damage to other structures or organs, including damage to the nerve, causing numbness.  · Re-tear of the tendon. This is rare.  What happens before the procedure?  Staying hydrated  Follow instructions from your health care provider about hydration, which may include:  · Up to 2 hours before the procedure - you may continue to drink clear liquids, such as water, clear fruit juice, black coffee, and plain tea.    Eating and drinking restrictions  Follow instructions from  your health care provider about eating and drinking, which may include:  · 8 hours before the procedure - stop eating heavy meals or foods such as meat, fried foods, or fatty foods.  · 6 hours before the procedure - stop eating light meals or foods, such as toast or cereal.  · 6 hours before the procedure - stop drinking milk or drinks that contain milk.  · 2 hours before the procedure - stop drinking clear liquids.  Medicines  · Ask your health care provider about:  ? Changing or stopping your regular medicines. This is especially important if you are taking diabetes medicines or blood thinners.  ? Taking medicines such as aspirin and ibuprofen. These medicines can thin your blood. Do not take these medicines before your procedure if your health care provider instructs you not to.  · You may be given antibiotic medicine to help prevent infection.  General instructions  · Your health care provider will examine the area from your lower leg to your heel.  · Your health care provider may order tests such as ultrasound or MRI, and may perform exams to check if:  ? You can point your toes up and down.  ? Your foot is in proper alignment.  · Do not use any products that contain nicotine or tobacco, such as cigarettes and e-cigarettes. If you need help quitting, ask your health care provider.  · Shower or bathe on either the night before the surgery or the morning of the surgery.  · Plan to have someone take you home from the hospital or clinic.  · Ask your health care provider how your surgical site will be marked or identified.  What happens during the procedure?  · To reduce your risk of infection:  ? Your health care team will wash or sanitize their hands.  ? Your skin will be washed with soap.  ? Hair may be removed from the surgical area.  ? A drape will be positioned around your lower leg.  · You will be given one or more of the following:  ? A medicine to help you relax (sedative).  ? A medicine that is injected  into an area of your body to numb everything below the injection site (regional anesthetic).  ? A medicine to make you fall asleep (general anesthetic).  · The surgeon will make an incision on the back side of your lower leg.  · The torn ends of your tendon will be stitched back together.  · The incisions will be closed with stitches (sutures) or staples.  · A bandage (dressing) will be applied over the incision.  The procedure may vary among health care providers and hospitals.  What happens after the procedure?  · Your blood pressure, heart rate, breathing rate, and blood oxygen level will be monitored until the medicines you were given have worn off.  · You will feel some pain when the numbing medicine wears off. Your health care provider will prescribe pain medicine for you to take at home.  · Your leg may be put in a cast or splint.  · You will be given instructions to keep your leg above the level of your heart to reduce swelling and pain.  · You will not be allowed to put weight on your leg.  · You will need to use crutches or another type of walking aid to keep weight off your leg.  · You may continue to receive antibiotic medicine.  Summary  · The Achilles tendon is a rope-like cord of tissue that connects the lower leg muscles to the heel.  · Achilles tendon repair is a surgery to repair an Achilles tendon that has been torn.  · Follow your health care provider's instructions before the procedure, including instructions on what to eat and drink and whether to stop taking your regular medicines.  · After your procedure, you will need to use crutches or another type of walking aid to keep weight off your leg.  This information is not intended to replace advice given to you by your health care provider. Make sure you discuss any questions you have with your health care provider.  Document Released: 12/23/2014 Document Revised: 10/17/2019 Document Reviewed: 12/04/2017  Elsevier Patient Education © 2020  [employer]      ACTIVITY: Rest and take it easy for the first 24 hours.  A responsible adult is recommended to remain with you during that time.  It is normal to feel sleepy.  We encourage you to not do anything that requires balance, judgment or coordination.    MILD FLU-LIKE SYMPTOMS ARE NORMAL. YOU MAY EXPERIENCE GENERALIZED MUSCLE ACHES, THROAT IRRITATION, HEADACHE AND/OR SOME NAUSEA.    FOR 24 HOURS DO NOT:  Drive, operate machinery or run household appliances.  Drink beer or alcoholic beverages.   Make important decisions or sign legal documents.    SPECIAL INSTRUCTIONS: Non weight bearing on left lower leg    DIET: To avoid nausea, slowly advance diet as tolerated, avoiding spicy or greasy foods for the first day.  Add more substantial food to your diet according to your physician's instructions.  Babies can be fed formula or breast milk as soon as they are hungry.  INCREASE FLUIDS AND FIBER TO AVOID CONSTIPATION.    SURGICAL DRESSING/BATHING: Keep splint clean and dry    FOLLOW-UP APPOINTMENT:  A follow-up appointment should be arranged with your doctor in 2 weeks; call to schedule.    You should CALL YOUR PHYSICIAN if you develop:  Fever greater than 101 degrees F.  Pain not relieved by medication, or persistent nausea or vomiting.  Excessive bleeding (blood soaking through dressing) or unexpected drainage from the wound.  Extreme redness or swelling around the incision site, drainage of pus or foul smelling drainage.  Inability to urinate or empty your bladder within 8 hours.  Problems with breathing or chest pain.    You should call 911 if you develop problems with breathing or chest pain.  If you are unable to contact your doctor or surgical center, you should go to the nearest emergency room or urgent care center.  Physician's telephone #: Dr. Guerrero [phone]    If any questions arise, call your doctor.  If your doctor is not available, please feel free to call the Surgical Center at  [phone]. The Contact Center is open Monday through Friday 7AM to 5PM and may speak to a nurse at [phone], or toll free at [phone].     A registered nurse may call you a few days after your surgery to see how you are doing after your procedure.    MEDICATIONS: Resume taking daily medication.  Take prescribed pain medication with food.  If no medication is prescribed, you may take non-aspirin pain medication if needed.  PAIN MEDICATION CAN BE VERY CONSTIPATING.  Take a stool softener or laxative such as senokot, pericolace, or milk of magnesia if needed.    Last pain medication given at  Oxycodone @ 9:30pm    If your physician has prescribed pain medication that includes Acetaminophen (Tylenol), do not take additional Acetaminophen (Tylenol) while taking the prescribed medication.    Depression / Suicide Risk    As you are discharged from this [facility] facility, it is important to learn how to keep safe from harming yourself.    Recognize the warning signs:  · Abrupt changes in personality, positive or negative- including increase in energy   · Giving away possessions  · Change in eating patterns- significant weight changes-  positive or negative  · Change in sleeping patterns- unable to sleep or sleeping all the time   · Unwillingness or inability to communicate  · Depression  · Unusual sadness, discouragement and loneliness  · Talk of wanting to die  · Neglect of personal appearance   · Rebelliousness- reckless behavior  · Withdrawal from people/activities they love  · Confusion- inability to concentrate     If you or a loved one observes any of these behaviors or has concerns about self-harm, here's what you can do:  · Talk about it- your feelings and reasons for harming yourself  · Remove any means that you might use to hurt yourself (examples: pills, rope, extension cords, firearm)  · Get professional help from the community (Mental Health, Substance Abuse, psychological counseling)  · Do  not be alone:Call your Safe Contact- someone whom you trust who will be there for you.  · Call your local CRISIS HOTLINE 915-1054 or 430-785-7262  · Call your local Children's Mobile Crisis Response Team [place] (003) 023-4035 or www.[employer]  · Call the toll free National Suicide Prevention Hotlines   · National Suicide Prevention Lifeline 422-074-SECD (9944)  · National [employer] Network 800-SUICIDE (055-3383)

## 2021-11-16 NOTE — OP REPORT
11/15/21       PREOPERATIVE DIAGNOSES:  1. Left Achilles rupture     POSTOPERATIVE DIAGNOSES:  1. Same    PROCEDURE PERFORMED:  1. Left open repair Achilles rupture     SURGEON:  Zach Guerrero MD     FIRST ASSISTANT:   Patrick Spence MD     SECOND ASSISTANT:  Delicia Cabral CFA     ANESTHESIA:  General endotracheal with local     ESTIMATED BLOOD LOSS:  None.     COMPLICATIONS:  None.    FINDINGS:  1. Left Achilles rupture     POSTOPERATIVE PLAN:  1. Nonweightbearing  2. Follow-up in 2 weeks     INDICATIONS:  The patient is a pleasant 25 y.o. male who has had   problems with the left leg.  Options were discussed   including operative and nonoperative.  The patients elected to undergo operative   intervention.  The above procedure was discussed.  All questions were   answered.  Risks of surgery explained, which included but not limited to   explained wound problems, infection, nerve injury, vascular injury, need for   further surgery.  The patient understands that they could have persistent risk of    pain and need for further surgery.  The patients understands and accepts these risks   and agreed to proceed.  Site was marked by myself prior to receiving   psychotropic medicines.     PROCEDURE IN DETAIL:  The patient was brought to the operating room and    underwent general endotracheal anesthetic without complications.    Left lower   extremity was prepped and draped in standard fashion in prone position with   all appropriate padding.  Positive site verification confirmed the appropriate   extremity as well as above procedure and confirmation that the patient received   preoperative antibiotics.  The leg was elevated and tourniquet was inflated to 250 mmHg.    Incision was made directly over the Achilles rupture as identified by a defect which was palpable.  This is brought directly through the peritenon.  Hematoma was identified and evacuated.  Rupture was complete.  Is an Allis clamp the proximal segment was  identified and a Brooks elevator was used to circumferentially mobilize the segment.  A #2 fiber tape ran through the most proximal segment and was brought through a locking Bennell stitch.  The exact procedure performed on the distal segment.  The foot was then held in slight plantarflexion and Achilles tendons were brought together with the medial sutures point tension.  The lateral side of the sutures were then tied with multiple knots.  The medial side was then pulled with multiple knots.  Attention was returned to be slightly more than the contralateral side which was palpable under the drape.  A 3-0 Vicryl was then used preferentially to bury the knots from the repair and approximate loose fibers from the proximal segment to the distal segment.  Thorough irrigation was performed.  The peritenon layer was closed with 3-0 Vicryl in the subcutaneous layers layer was closed with 3-0 Vicryl.  Skin was closed with 3-0 nylon.        Sterile dressings were applied.  Tourniquet was released.  He was placed into a posterior splint patient was transferred to the recovery room in good condition.    Utilization of Patrick Walker   was necessary for patient positioning needing holding retracting wound closure and dressing placement.  The assistant was present throughout the entire procedure.

## 2021-11-16 NOTE — ANESTHESIA PROCEDURE NOTES
[address] Wallis Said, 1956, SD07/SD07-01, 9/17/2021    History  [place]:  The encounter diagnosis was S/P cervical spinal fusion. Patient  has a past medical history of Arthritis, CAD (coronary artery disease), H/O 24 hour EKG monitoring, H/O cardiac catheterization, H/O cardiovascular stress test, H/O chest x-ray, H/O Doppler ultrasound, H/O echocardiogram, Hashimoto disease, History of cardiac monitoring, History of CT scan of brain, History of CT scan of chest, Hyperlipidemia, Hypothyroidism, Left shoulder pain, Obesity (BMI 30.0-34.9), Osteoarthritis of right knee, Prolonged emergence from general anesthesia, S/P CABG x 2, TIA, and TIA (transient ischemic attack). Patient  has a past surgical history that includes Appendectomy; Cholecystectomy; Coronary artery bypass graft (6/18/2012); Inguinal hernia repair (Right, 07/14/2016); Cardiac surgery; and Cardiac catheterization. Subjective:  Patient states: \"They diagnosed me with carpal tunnel, I used to wear gloves too. My pain before this was my R shoulder down to the shoulder blade\", \"My whole R side is weaker\"  Pain:  \"Discomfort at the drain spot\".     Communication with other providers:  Handoff to RN, co-eval with ZELALEM Tyler   Restrictions: C-collar for all OOB activity, Spinal precautions, low fall risk     Home Setup/Prior level of function  Social/Functional History  Lives With: Spouse  Type of Home: House  Home Layout: Two level, Able to Live on Main level with bedroom/bathroom (bedroom is upstairs)  Home Access: Stairs to enter with rails  Entrance Stairs - Number of Steps: 2  Entrance Stairs - Rails: Right  Bathroom Shower/Tub: Tub/Shower unit, Shower chair with back  Bathroom Toilet: Handicap height  Bathroom Equipment: Tub transfer bench, Commode  Home Equipment: Rolling walker, Cane  ADL Assistance: [address]: Independent  Homemaking Responsibilities: Airway    Date/Time: 11/15/2021 8:32 PM  Performed by: Divya Douglsas M.D.  Authorized by: Divya Douglass M.D.     Location:  OR  Urgency:  Elective  Difficult Airway: No    Indications for Airway Management:  Anesthesia      Spontaneous Ventilation: absent    Sedation Level:  Deep  Preoxygenated: Yes    Patient Position:  Sniffing  Mask Difficulty Assessment:  1 - vent by mask  Final Airway Type:  Endotracheal airway  Final Endotracheal Airway:  ETT  Cuffed: Yes    Technique Used for Successful ETT Placement:  Direct laryngoscopy    Insertion Site:  Oral  Blade Type:  Lotus  Laryngoscope Blade/Videolaryngoscope Blade Size:  4  ETT Size (mm):  7.5  Measured from:  Teeth  ETT to Teeth (cm):  23  Placement Verified by: auscultation and capnometry    Cormack-Lehane Classification:  Grade I - full view of glottis  Number of Attempts at Approach:  1           Yes  Ambulation Assistance: Independent  Transfer Assistance: Independent  Active : Yes  Occupation: Retired  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 No supplemental 02 needs. Pt with recent onset Tachycardia per notes, HR ranges from 120-135 BPM throughout session. · Cognition: WFL, pt is A&O x3, talkative, see OT/SLP note for further evaluation. Musculoskeletal  · ROM R/L:  WFL BLE,. · Strength R/L:  LLE 5/5, RLE -3-4+/5. Notable R DF weakness (3-/5), decreased in function and endurance. · Neuro:  Pt with complicated history of \"stroke-like\" episodes which pt and wife report pt had significant R-side deficits following this and still has residual R deficits. · Gait pattern: Pt demonstrates step-through pattern FRANCO with increased R stance time, R LE circumduction and R trunk lean in stance, mild R foot drop with sufficient clearance, CGA without AD. Mobility:  · Rolling L/R:  SBA  · Supine to sit:  Min A  · Transfers: CGA  · Sitting balance: Mod I.    · Standing balance:  CGA. · Gait: CGA    [facility] 6 Clicks Inpatient Mobility:  AM-PAC Inpatient Mobility Raw Score : 18    Treatment:  Bed mobility: PT/OT provide v/c and demonstration for completion of rolling and supine/sit with maintenance of spinal precautions via log roll technique. Pt demonstrates SBA for Sit>supine and good carryover with spinal precaution, Min a required for supine>sit as pt with difficulty advancing trunk to upright d/t R-side weakness. PT/OT discussed multiple modifications and approaches to this for pt to use strong side.      Sitting balance: Pt maintains good upright, PT services in order to address impairments and promote return to PLOF. PT to recommend d/c to home with assist PRN and OP PT services for rehab. Pt likely to d/c home soon, PT plans to eval and d/c this pt.     Complexity: Moderate    Recommendations for NURSING mobility: AMB in hallway without AD, CGA    Time:   Time in: 11:17  Time out: 11:55  Timed treatment minutes: 28  Total time: 38    Electronically signed by:    Caleb Brooks, PT  9/37/9678, 37:71 PM  PT Lic #: [id number]

## 2021-11-16 NOTE — PROGRESS NOTES
Patient in PACU in stable condition. VSS. Surgical dressing clean dry intact. LLE toes pink warm with cap refill less than 3 seconds. Will continue to monitor and medicate appropriately.

## 2021-11-16 NOTE — ANESTHESIA TIME REPORT
Anesthesia Start and Stop Event Times     Date Time Event    11/15/2021 1924 Ready for Procedure     2029 Anesthesia Start     2124 Anesthesia Stop        Responsible Staff  11/15/21    Name Role Begin End    Divya Douglass M.D. Anesth 2029 2124        Preop Diagnosis (Free Text):  Pre-op Diagnosis     Achilles rupture, left [S86.012A]        Preop Diagnosis (Codes):  Diagnosis Information     Diagnosis Code(s): Achilles rupture, left [S86.012A]        Premium Reason  A. 3PM - 7AM    Comments: PLEASE NOTE THAT SCIATIC NERVE BLOCK PERFORMED BY DR. MALCOLM IN PACU

## 2021-11-16 NOTE — ANESTHESIA POSTPROCEDURE EVALUATION
Patient: Yohannes Lewis    Procedure Summary     Date: 11/15/21 Room / Location: [place] [address] / SURGERY [facility]    Anesthesia Start: 2029 Anesthesia Stop: 2124    Procedure: ACHILLES REPAIR (Left Leg Lower) Diagnosis:       Achilles rupture, left      (LEFT ACHILLES RUPTURE)    Surgeons: Zach Guerrero M.D. Responsible Provider: Divya Douglass M.D.    Anesthesia Type: general, peripheral nerve block ASA Status: 3          Final Anesthesia Type: general, peripheral nerve block  Last vitals  BP   Blood Pressure: 155/57    Temp   36.6 °C (97.8 °F)    Pulse   89   Resp   16    SpO2   92 %      Anesthesia Post Evaluation    Patient location during evaluation: PACU  Patient participation: complete - patient participated  Level of consciousness: awake and alert  Pain score: 6    Airway patency: patent  Anesthetic complications: no  Cardiovascular status: hemodynamically stable  Respiratory status: acceptable  Hydration status: euvolemic    PONV: none          No complications documented.     Nurse Pain Score: 6 (NPRS)

## 2021-11-16 NOTE — ANESTHESIA PROCEDURE NOTES
Peripheral Block    Date/Time: 11/15/2021 11:01 PM  Performed by: Sameer Briscoe M.D.  Authorized by: Divya Douglass M.D.     Patient Location:  Post-op  Start Time:  11/15/2021 11:01 PM  End Time:  11/15/2021 11:05 PM  Reason for Block: at surgeon's request and post-op pain management ONLY    patient identified, IV checked, site marked, risks and benefits discussed, surgical consent, monitors and equipment checked, pre-op evaluation and timeout performed    Patient Position:  Supine  Prep: ChloraPrep    Monitoring:  Heart rate, continuous pulse ox and cardiac monitor  Block Region:  Lower Extremity  Lower Extremity - Block Type:  SCIATIC nerve block, lateral approach    Laterality:  Left  Procedures: ultrasound guided  Image captured, interpreted and electronically stored.  Local Infiltration:  Lidocaine  Strength:  1 %  Dose:  3 ml  Block Type:  Single-shot  Needle Length:  100mm  Needle Gauge:  21 G  Needle Localization:  Ultrasound guidance  Injection Assessment:  Negative aspiration for heme, no paresthesia on injection, incremental injection and local visualized surrounding nerve on ultrasound  Evidence of intravascular injection: No     US Guided Sciatic Nerve Block   US probe placed several cm proximal to popliteal crease on posterior thigh and scanned caudad and cephalad until Sciatic Nerve (SN) identified superficial/lateral to popliteal artery.  Needle inserted lateral to probe in an in plane approach under direct visualization to a perineural position.  After negative aspiration LA injected with ease and visualized surrounding the SN.

## 2021-11-16 NOTE — OR NURSING
Discharge instructions reviewed with patient and sister at bedside. Educated on procedure, medications, follow up, and diet. Verbalized understanding and all questions answered. VSS. Surgical dressing clean dry intact. Aox4 and on RA. LLE toes pink warm with cap refill less than 3 seconds. Belongings returned. Patient discharged off unit in wheelchair with family. No further needs.

## 2022-09-30 ENCOUNTER — HOSPITAL ENCOUNTER (EMERGENCY)
Facility: MEDICAL CENTER | Age: 27
End: 2022-09-30
Attending: EMERGENCY MEDICINE
Payer: COMMERCIAL

## 2022-09-30 ENCOUNTER — APPOINTMENT (OUTPATIENT)
Dept: RADIOLOGY | Facility: MEDICAL CENTER | Age: 27
End: 2022-09-30
Payer: COMMERCIAL

## 2022-09-30 VITALS
WEIGHT: 312.61 LBS | TEMPERATURE: 98 F | SYSTOLIC BLOOD PRESSURE: 139 MMHG | DIASTOLIC BLOOD PRESSURE: 71 MMHG | OXYGEN SATURATION: 92 % | RESPIRATION RATE: 14 BRPM | BODY MASS INDEX: 43.77 KG/M2 | HEART RATE: 99 BPM | HEIGHT: 71 IN

## 2022-09-30 DIAGNOSIS — R07.9 CHEST PAIN, UNSPECIFIED TYPE: ICD-10-CM

## 2022-09-30 DIAGNOSIS — Z63.4 BEREAVEMENT: ICD-10-CM

## 2022-09-30 LAB
ALBUMIN SERPL BCP-MCNC: 5.1 G/DL (ref 3.2–4.9)
ALBUMIN/GLOB SERPL: 1.3 G/DL
ALP SERPL-CCNC: 133 U/L (ref 30–99)
ALT SERPL-CCNC: 72 U/L (ref 2–50)
ANION GAP SERPL CALC-SCNC: 14 MMOL/L (ref 7–16)
AST SERPL-CCNC: 36 U/L (ref 12–45)
BASOPHILS # BLD AUTO: 0.7 % (ref 0–1.8)
BASOPHILS # BLD: 0.07 K/UL (ref 0–0.12)
BILIRUB SERPL-MCNC: 0.5 MG/DL (ref 0.1–1.5)
BUN SERPL-MCNC: 15 MG/DL (ref 8–22)
CALCIUM SERPL-MCNC: 9.6 MG/DL (ref 8.5–10.5)
CHLORIDE SERPL-SCNC: 98 MMOL/L (ref 96–112)
CO2 SERPL-SCNC: 23 MMOL/L (ref 20–33)
CREAT SERPL-MCNC: 0.83 MG/DL (ref 0.5–1.4)
D DIMER PPP IA.FEU-MCNC: 0.42 UG/ML (FEU) (ref 0–0.5)
EKG IMPRESSION: NORMAL
EOSINOPHIL # BLD AUTO: 0.25 K/UL (ref 0–0.51)
EOSINOPHIL NFR BLD: 2.5 % (ref 0–6.9)
ERYTHROCYTE [DISTWIDTH] IN BLOOD BY AUTOMATED COUNT: 42.4 FL (ref 35.9–50)
GFR SERPLBLD CREATININE-BSD FMLA CKD-EPI: 122 ML/MIN/1.73 M 2
GLOBULIN SER CALC-MCNC: 4 G/DL (ref 1.9–3.5)
GLUCOSE SERPL-MCNC: 103 MG/DL (ref 65–99)
HCT VFR BLD AUTO: 47.7 % (ref 42–52)
HGB BLD-MCNC: 16.2 G/DL (ref 14–18)
IMM GRANULOCYTES # BLD AUTO: 0.07 K/UL (ref 0–0.11)
IMM GRANULOCYTES NFR BLD AUTO: 0.7 % (ref 0–0.9)
LYMPHOCYTES # BLD AUTO: 2.93 K/UL (ref 1–4.8)
LYMPHOCYTES NFR BLD: 29.6 % (ref 22–41)
MCH RBC QN AUTO: 28.9 PG (ref 27–33)
MCHC RBC AUTO-ENTMCNC: 34 G/DL (ref 33.7–35.3)
MCV RBC AUTO: 85 FL (ref 81.4–97.8)
MONOCYTES # BLD AUTO: 0.54 K/UL (ref 0–0.85)
MONOCYTES NFR BLD AUTO: 5.5 % (ref 0–13.4)
NEUTROPHILS # BLD AUTO: 6.03 K/UL (ref 1.82–7.42)
NEUTROPHILS NFR BLD: 61 % (ref 44–72)
NRBC # BLD AUTO: 0 K/UL
NRBC BLD-RTO: 0 /100 WBC
PLATELET # BLD AUTO: 236 K/UL (ref 164–446)
PMV BLD AUTO: 10.5 FL (ref 9–12.9)
POTASSIUM SERPL-SCNC: 3.5 MMOL/L (ref 3.6–5.5)
PROT SERPL-MCNC: 9.1 G/DL (ref 6–8.2)
RBC # BLD AUTO: 5.61 M/UL (ref 4.7–6.1)
SODIUM SERPL-SCNC: 135 MMOL/L (ref 135–145)
TROPONIN T SERPL-MCNC: 6 NG/L (ref 6–19)
WBC # BLD AUTO: 9.9 K/UL (ref 4.8–10.8)

## 2022-09-30 PROCEDURE — 80053 COMPREHEN METABOLIC PANEL: CPT

## 2022-09-30 PROCEDURE — 99284 EMERGENCY DEPT VISIT MOD MDM: CPT

## 2022-09-30 PROCEDURE — 93005 ELECTROCARDIOGRAM TRACING: CPT

## 2022-09-30 PROCEDURE — 93005 ELECTROCARDIOGRAM TRACING: CPT | Performed by: EMERGENCY MEDICINE

## 2022-09-30 PROCEDURE — 71045 X-RAY EXAM CHEST 1 VIEW: CPT

## 2022-09-30 PROCEDURE — 85025 COMPLETE CBC W/AUTO DIFF WBC: CPT

## 2022-09-30 PROCEDURE — 84484 ASSAY OF TROPONIN QUANT: CPT

## 2022-09-30 PROCEDURE — 85379 FIBRIN DEGRADATION QUANT: CPT

## 2022-09-30 PROCEDURE — 36415 COLL VENOUS BLD VENIPUNCTURE: CPT

## 2022-09-30 SDOH — SOCIAL STABILITY - SOCIAL INSECURITY: DISSAPEARANCE AND DEATH OF FAMILY MEMBER: Z63.4

## 2022-09-30 NOTE — ED TRIAGE NOTES
Yohannes Lewis  26 y.o. male  Chief Complaint   Patient presents with    Chest Pain     Midsternal chest pain for the last 3 weeks, increased in pain tonight. Pt mother  from MI so he wanted to get checked.     Pt ambulatory with steady gait to triage for above complaint. Chest pain protocol ordered, per  order d-dimer as well.    Pt is alert, oriented, and follows commands. Pt speaking in full sentences and responds appropriately to questions. No acute distress noted in triage and respirations are even and unlabored.     Pt placed in phlebotomy chair and educated on triage process. Pt encouraged to alert staff for any changes in condition.

## 2022-09-30 NOTE — ED PROVIDER NOTES
ED Provider Note    CHIEF COMPLAINT  Chief Complaint   Patient presents with    Chest Pain     Midsternal chest pain for the last 3 weeks, increased in pain tonight. Pt mother  from MI so he wanted to get checked.       HPI  Yohannes Lewis is a 26 y.o. male who presents to the emergency department complaining of chest discomfort. Past medical history as documented below. Occasional alcohol and tobacco use. He explained that his mother passed away roughly 4 months ago in her early 60s. Since then he has continued to grieve the loss of his mother will also come increasingly concerned about possibly having had a heart attack himself. He does note that through COVID he has gained some weight and he is also been experiencing increase anxiety depression since the loss of his mother. Over the last couple weeks he felt that he may have been having some chest discomfort and after doing some online research he became increasingly concerned to get about possible underlying cardiac disease. At this point decided come to the emergency department for further evaluation.        REVIEW OF SYSTEMS  See HPI for further details. All other systems are negative.     PAST MEDICAL HISTORY   has a past medical history of Achilles rupture, left (2021), Hernia of testicle, and Patient denies medical problems.    SOCIAL HISTORY  Social History     Tobacco Use    Smoking status: Former     Types: Cigarettes     Quit date: 11/15/2020     Years since quittin.8    Smokeless tobacco: Never   Vaping Use    Vaping Use: Never used   Substance and Sexual Activity    Alcohol use: Yes     Comment: 1/day    Drug use: Never    Sexual activity: Not on file       SURGICAL HISTORY   has a past surgical history that includes hernia repair and repair achilles tendon,primary (Left, 11/15/2021).    CURRENT MEDICATIONS  Home Medications       Reviewed by Niki Mcghee R.N. (Registered Nurse) on 22 at 0051  Med List Status: Partial  "    Medication Last Dose Status   ASPIRIN PO  Active   gabapentin (NEURONTIN) 300 MG Cap  Active   hydrOXYzine pamoate (VISTARIL) 50 MG Cap  Active                    ALLERGIES  No Known Allergies    PHYSICAL EXAM  VITAL SIGNS: /71   Pulse 99   Temp 36.7 °C (98 °F)   Resp 14   Ht 1.803 m (5' 11\")   Wt (!) 142 kg (312 lb 9.8 oz)   SpO2 92%   BMI 43.60 kg/m²  @SANGEETHA[[id number]::@   Pulse ox interpretation: I interpret this pulse ox as normal.  Constitutional: Alert in no apparent distress.  HENT: No signs of trauma, Bilateral external ears normal, Nose normal.   Eyes: Pupils are equal and reactive  Neck: Normal range of motion, No tenderness, Supple  Cardiovascular: , regular rate and rhythm, no murmurs.   Thorax & Lungs: Normal breath sounds, No respiratory distress, No wheezing, No chest tenderness.   Abdomen: Bowel sounds normal, Soft, No tenderness  Skin: Warm, Dry, No erythema, No rash.   Back: No bony tenderness, No CVA tenderness.   Extremities: Intact distal pulses, No edema, No tenderness, No cyanosis,  Negative Michelle's sign.   Musculoskeletal: Good range of motion in all major joints. No tenderness to palpation or major deformities noted.   Neurologic: Alert , Normal motor function, Normal sensory function, No focal deficits noted.   Psychiatric: Affect is anxious, Judgment normal, Mood normal.       DIAGNOSTIC STUDIES / PROCEDURES        LABS  Results for orders placed or performed during the hospital encounter of 09/30/22   D-DIMER   Result Value Ref Range    D-Dimer Screen 0.42 0.00 - 0.50 ug/mL (FEU)   CBC with Differential   Result Value Ref Range    WBC 9.9 4.8 - 10.8 K/uL    RBC 5.61 4.70 - 6.10 M/uL    Hemoglobin 16.2 14.0 - 18.0 g/dL    Hematocrit 47.7 42.0 - 52.0 %    MCV 85.0 81.4 - 97.8 fL    MCH 28.9 27.0 - 33.0 pg    MCHC 34.0 33.7 - 35.3 g/dL    RDW 42.4 35.9 - 50.0 fL    Platelet Count 236 164 - 446 K/uL    MPV 10.5 9.0 - 12.9 fL    Neutrophils-Polys 61.00 44.00 - 72.00 %    " Lymphocytes 29.60 22.00 - 41.00 %    Monocytes 5.50 0.00 - 13.40 %    Eosinophils 2.50 0.00 - 6.90 %    Basophils 0.70 0.00 - 1.80 %    Immature Granulocytes 0.70 0.00 - 0.90 %    Nucleated RBC 0.00 /100 WBC    Neutrophils (Absolute) 6.03 1.82 - 7.42 K/uL    Lymphs (Absolute) 2.93 1.00 - 4.80 K/uL    Monos (Absolute) 0.54 0.00 - 0.85 K/uL    Eos (Absolute) 0.25 0.00 - 0.51 K/uL    Baso (Absolute) 0.07 0.00 - 0.12 K/uL    Immature Granulocytes (abs) 0.07 0.00 - 0.11 K/uL    NRBC (Absolute) 0.00 K/uL   Complete Metabolic Panel (CMP)   Result Value Ref Range    Sodium 135 135 - 145 mmol/L    Potassium 3.5 (L) 3.6 - 5.5 mmol/L    Chloride 98 96 - 112 mmol/L    Co2 23 20 - 33 mmol/L    Anion Gap 14.0 7.0 - 16.0    Glucose 103 (H) 65 - 99 mg/dL    Bun 15 8 - 22 mg/dL    Creatinine 0.83 0.50 - 1.40 mg/dL    Calcium 9.6 8.5 - 10.5 mg/dL    AST(SGOT) 36 12 - 45 U/L    ALT(SGPT) 72 (H) 2 - 50 U/L    Alkaline Phosphatase 133 (H) 30 - 99 U/L    Total Bilirubin 0.5 0.1 - 1.5 mg/dL    Albumin 5.1 (H) 3.2 - 4.9 g/dL    Total Protein 9.1 (H) 6.0 - 8.2 g/dL    Globulin 4.0 (H) 1.9 - 3.5 g/dL    A-G Ratio 1.3 g/dL   Troponin   Result Value Ref Range    Troponin T 6 6 - 19 ng/L   ESTIMATED GFR   Result Value Ref Range    GFR (CKD-EPI) 122 >60 mL/min/1.73 m 2   EKG   Result Value Ref Range    Report       [facility] Emergency Dept.    Test Date:  2022  Pt Name:    MARLYN PERLA           Department: ER  MRN:        [id number]                      Room:  Gender:     Male                         Technician: [id number]  :        1995                   Requested By:ER TRIAGE PROTOCOL  Order #:    [id number]                    Reading MD:    Measurements  Intervals                                Axis  Rate:       117                          P:          51  WY:         175                          QRS:        23  QRSD:       91                           T:          29  QT:         311  QTc:         434    Interpretive Statements  Sinus tachycardia  No previous ECG available for comparison           RADIOLOGY  DX-CHEST-PORTABLE (1 VIEW)   Final Result         1.  No acute cardiopulmonary disease.            COURSE & MEDICAL DECISION MAKING  Pertinent Labs & Imaging studies reviewed. (See chart for details)    26-year-old male presented emergency room with complaint of chest pain. History as above. I have a high suspicion for underlying anxiety and bereavement. Understandably he has concerned about cardiac pathology given his mother's recent death. Heart score is low and I have a low suspicion for ACS etiology the patient's current symptomatology. At this point he has been referred to outpatient PCP as well as cardiology for follow-up. He is understanding return precautions here the ER with any changes or worsening in the interim.      The patient will return for worsening symptoms and is stable at the time of discharge. The patient verbalizes understanding and will comply.    FINAL IMPRESSION  1. Chest pain, unspecified type    2. Bereavement            Electronically signed by: Eliazar Patricia M.D., 9/30/2022 1:54 AM

## 2022-10-22 ENCOUNTER — APPOINTMENT (OUTPATIENT)
Dept: RADIOLOGY | Facility: MEDICAL CENTER | Age: 27
End: 2022-10-22
Attending: EMERGENCY MEDICINE
Payer: COMMERCIAL

## 2022-10-22 ENCOUNTER — HOSPITAL ENCOUNTER (EMERGENCY)
Facility: MEDICAL CENTER | Age: 27
End: 2022-10-23
Attending: EMERGENCY MEDICINE
Payer: COMMERCIAL

## 2022-10-22 DIAGNOSIS — F41.9 ANXIETY: ICD-10-CM

## 2022-10-22 DIAGNOSIS — R07.9 CHEST PAIN, UNSPECIFIED TYPE: Primary | ICD-10-CM

## 2022-10-22 LAB
BASOPHILS # BLD AUTO: 0.5 % (ref 0–1.8)
BASOPHILS # BLD: 0.05 K/UL (ref 0–0.12)
EKG IMPRESSION: NORMAL
EOSINOPHIL # BLD AUTO: 0.22 K/UL (ref 0–0.51)
EOSINOPHIL NFR BLD: 2 % (ref 0–6.9)
ERYTHROCYTE [DISTWIDTH] IN BLOOD BY AUTOMATED COUNT: 42.4 FL (ref 35.9–50)
HCT VFR BLD AUTO: 44.8 % (ref 42–52)
HGB BLD-MCNC: 15.2 G/DL (ref 14–18)
IMM GRANULOCYTES # BLD AUTO: 0.06 K/UL (ref 0–0.11)
IMM GRANULOCYTES NFR BLD AUTO: 0.5 % (ref 0–0.9)
LYMPHOCYTES # BLD AUTO: 2.76 K/UL (ref 1–4.8)
LYMPHOCYTES NFR BLD: 25.1 % (ref 22–41)
MCH RBC QN AUTO: 29 PG (ref 27–33)
MCHC RBC AUTO-ENTMCNC: 33.9 G/DL (ref 33.7–35.3)
MCV RBC AUTO: 85.3 FL (ref 81.4–97.8)
MONOCYTES # BLD AUTO: 0.78 K/UL (ref 0–0.85)
MONOCYTES NFR BLD AUTO: 7.1 % (ref 0–13.4)
NEUTROPHILS # BLD AUTO: 7.11 K/UL (ref 1.82–7.42)
NEUTROPHILS NFR BLD: 64.8 % (ref 44–72)
NRBC # BLD AUTO: 0 K/UL
NRBC BLD-RTO: 0 /100 WBC
PLATELET # BLD AUTO: 252 K/UL (ref 164–446)
PMV BLD AUTO: 10.2 FL (ref 9–12.9)
RBC # BLD AUTO: 5.25 M/UL (ref 4.7–6.1)
WBC # BLD AUTO: 11 K/UL (ref 4.8–10.8)

## 2022-10-22 PROCEDURE — 85025 COMPLETE CBC W/AUTO DIFF WBC: CPT

## 2022-10-22 PROCEDURE — 94760 N-INVAS EAR/PLS OXIMETRY 1: CPT

## 2022-10-22 PROCEDURE — 71045 X-RAY EXAM CHEST 1 VIEW: CPT

## 2022-10-22 PROCEDURE — 93005 ELECTROCARDIOGRAM TRACING: CPT | Performed by: EMERGENCY MEDICINE

## 2022-10-22 PROCEDURE — 93005 ELECTROCARDIOGRAM TRACING: CPT

## 2022-10-22 PROCEDURE — 84484 ASSAY OF TROPONIN QUANT: CPT

## 2022-10-22 PROCEDURE — 83690 ASSAY OF LIPASE: CPT

## 2022-10-22 PROCEDURE — 96374 THER/PROPH/DIAG INJ IV PUSH: CPT

## 2022-10-22 PROCEDURE — 80053 COMPREHEN METABOLIC PANEL: CPT

## 2022-10-22 PROCEDURE — 36415 COLL VENOUS BLD VENIPUNCTURE: CPT

## 2022-10-22 PROCEDURE — 99285 EMERGENCY DEPT VISIT HI MDM: CPT

## 2022-10-22 ASSESSMENT — FIBROSIS 4 INDEX: FIB4 SCORE: 0.5

## 2022-10-22 ASSESSMENT — PAIN DESCRIPTION - PAIN TYPE: TYPE: ACUTE PAIN

## 2022-10-23 VITALS
BODY MASS INDEX: 43.68 KG/M2 | OXYGEN SATURATION: 92 % | TEMPERATURE: 97.8 F | HEIGHT: 71 IN | SYSTOLIC BLOOD PRESSURE: 119 MMHG | HEART RATE: 91 BPM | WEIGHT: 312 LBS | DIASTOLIC BLOOD PRESSURE: 56 MMHG | RESPIRATION RATE: 23 BRPM

## 2022-10-23 LAB
ALBUMIN SERPL BCP-MCNC: 4.4 G/DL (ref 3.2–4.9)
ALBUMIN/GLOB SERPL: 1.4 G/DL
ALP SERPL-CCNC: 125 U/L (ref 30–99)
ALT SERPL-CCNC: 51 U/L (ref 2–50)
ANION GAP SERPL CALC-SCNC: 12 MMOL/L (ref 7–16)
AST SERPL-CCNC: 24 U/L (ref 12–45)
BILIRUB SERPL-MCNC: 0.3 MG/DL (ref 0.1–1.5)
BUN SERPL-MCNC: 15 MG/DL (ref 8–22)
CALCIUM SERPL-MCNC: 9.1 MG/DL (ref 8.5–10.5)
CHLORIDE SERPL-SCNC: 101 MMOL/L (ref 96–112)
CO2 SERPL-SCNC: 22 MMOL/L (ref 20–33)
CREAT SERPL-MCNC: 0.85 MG/DL (ref 0.5–1.4)
EKG IMPRESSION: NORMAL
GFR SERPLBLD CREATININE-BSD FMLA CKD-EPI: 121 ML/MIN/1.73 M 2
GLOBULIN SER CALC-MCNC: 3.2 G/DL (ref 1.9–3.5)
GLUCOSE SERPL-MCNC: 111 MG/DL (ref 65–99)
LIPASE SERPL-CCNC: 22 U/L (ref 11–82)
POTASSIUM SERPL-SCNC: 3.8 MMOL/L (ref 3.6–5.5)
PROT SERPL-MCNC: 7.6 G/DL (ref 6–8.2)
SODIUM SERPL-SCNC: 135 MMOL/L (ref 135–145)
TROPONIN T SERPL-MCNC: <6 NG/L (ref 6–19)

## 2022-10-23 PROCEDURE — 700111 HCHG RX REV CODE 636 W/ 250 OVERRIDE (IP): Performed by: EMERGENCY MEDICINE

## 2022-10-23 PROCEDURE — 700105 HCHG RX REV CODE 258: Performed by: EMERGENCY MEDICINE

## 2022-10-23 RX ORDER — SODIUM CHLORIDE 9 MG/ML
1000 INJECTION, SOLUTION INTRAVENOUS ONCE
Status: COMPLETED | OUTPATIENT
Start: 2022-10-23 | End: 2022-10-23

## 2022-10-23 RX ORDER — HYDROXYZINE HYDROCHLORIDE 25 MG/1
25 TABLET, FILM COATED ORAL 3 TIMES DAILY PRN
Qty: 30 TABLET | Refills: 0 | Status: SHIPPED | OUTPATIENT
Start: 2022-10-23

## 2022-10-23 RX ORDER — LORAZEPAM 2 MG/ML
1 INJECTION INTRAMUSCULAR ONCE
Status: COMPLETED | OUTPATIENT
Start: 2022-10-23 | End: 2022-10-23

## 2022-10-23 RX ADMIN — LORAZEPAM 1 MG: 2 INJECTION INTRAMUSCULAR; INTRAVENOUS at 00:14

## 2022-10-23 RX ADMIN — SODIUM CHLORIDE 1000 ML: 9 INJECTION, SOLUTION INTRAVENOUS at 00:14

## 2022-10-23 NOTE — DISCHARGE INSTRUCTIONS
Again, your work-up tonight was negative.  Your chest x-ray showed no abnormalities.  EKG showed no heart problems.  I think there is a component of anxiety related to her chest pain.  I have sent a medication home with you to your pharmacy that you can potentially help but ultimately need to work on therapy for strategies to help control your anxiety.  Follow-up with your cardiology and PCP visit next month.  If you have any worsening symptoms or concerns, please come back to emergency department.  Thank you for coming in today.

## 2022-10-23 NOTE — ED TRIAGE NOTES
"Chief Complaint   Patient presents with    Chest Pain     X 1 week intermittently     Dizziness     X 1 week intermittently      Pt came into ER for above CC, pt was seen in er previously for same symptoms and was informed to come back if they got worse. Pt has had CC for approximately 1 week. Pt denies N/V, or SOB. Pt sates \"it feels like something is stuck in my throat\"      Pt is alert and oriented, speaking in full sentences, follows commands and responds appropriately to questions.      Pt placed in lobby. Pt educated on triage process and apologized for wait time. Pt encouraged to alert staff for any changes.     Patient and staff wearing appropriate PPE.    Vitals:    10/22/22 2252   BP: (!) 156/106   Pulse:    Resp:    Temp:    SpO2:        "

## 2022-10-23 NOTE — ED PROVIDER NOTES
"ED Provider Note    Scribed for Scott Pantoja by Maritza Vial. 10/22/2022  11:31 PM    Primary care provider: Cintiha Paredes P.A.-C.  Means of arrival: Walk-In  History obtained from: Patient  History limited by: None    CHIEF COMPLAINT  Chief Complaint   Patient presents with    Chest Pain     X 1 week intermittently     Dizziness     X 1 week intermittently      HPI  Yohannes Lewis is a 26 y.o. male who presents to the Emergency Department for evaluation of mid sternal chest pains onset 4-5 days ago. He describes the pain as feeling like there is \"something stuck in his throat\". Pain is alleviated with a deep breath. The patient was also seen for similar chest pains on 9/30/2022 for the same pain, which may have triggered his initial pain at the time. He notes that the chest pain from last time subsided, but then returned on Tuesday. He has also been experiencing associated dizziness, nausea, shortness of breath in the morning, palpitations, head pounding, insomnia, and weakness. He has also been experiencing increased anxiety since his mother past. He is not vaccinated for Covid and denies any sick contact. He has an appointment with his cardiologist in November, but has not gotten any help for his mental health.   No leg swelling, productive cough, fever, fatigue, diarrhea, pain with urination. He admits to drinking alcohol today, but states this is not a usual occurrence. He has not had any water today, and denies smoking cigarettes or drug use today.  Quality:pressure  Duration: 5 days  Severity: mild to moderate  Associated sx: dizziness, nausea, shortness of breath in the morning, palpitations, head pounding, insomnia, and weakness    REVIEW OF SYSTEMS  As above, all other systems reviewed and are negative.   See HPI for further details.     PAST MEDICAL HISTORY   has a past medical history of Achilles rupture, left (11/11/2021), Hernia of testicle, and Patient denies medical " "problems.  SURGICAL HISTORY   has a past surgical history that includes hernia repair and repair achilles tendon,primary (Left, 11/15/2021).  SOCIAL HISTORY  Social History     Tobacco Use    Smoking status: Former     Types: Cigarettes     Quit date: 11/15/2020     Years since quittin.9    Smokeless tobacco: Never   Vaping Use    Vaping Use: Never used   Substance Use Topics    Alcohol use: Yes     Comment: 1/day    Drug use: Never      Social History     Substance and Sexual Activity   Drug Use Never     FAMILY HISTORY  History reviewed. No pertinent family history.  CURRENT MEDICATIONS  Home Medications       Reviewed by Nelson Martínez R.N. (Registered Nurse) on 10/22/22 at 2259  Med List Status: Not Addressed     Medication Last Dose Status   ASPIRIN PO  Active   gabapentin (NEURONTIN) 300 MG Cap  Active   hydrOXYzine pamoate (VISTARIL) 50 MG Cap  Active             ALLERGIES  No Known Allergies    PHYSICAL EXAM    VITAL SIGNS:   Vitals:    10/22/22 2257 10/22/22 2336 10/23/22 0000 10/23/22 0024   BP:   124/87    Pulse:  (!) 111 (!) 106 98   Resp:   (!) 26 20   Temp:       TempSrc:       SpO2:  96% 95% 93%   Weight: (!) 142 kg (312 lb)      Height: 1.803 m (5' 11\")          Vitals: My interpretation: Hypertensive, tachycardic, afebrile, not hypoxic    Reinterpretation of vitals: Improved    Cardiac Monitor Interpretation: The cardiac monitor revealed normal Sinus Rhythm as interpreted by me. The cardiac monitor was ordered secondary to the patient's history of chest pain and to monitor for dysrhythmia and/or tachycardia.    PE:   Constitutional: mild distress. Anxious appearing. Well developed, Well nourished, Non-toxic appearance.   HENT: Normocephalic, Atraumatic, Bilateral external ears normal, Oropharynx is clear mucous membranes are moist. No oral exudates or nasal discharge.   Eyes: Pupils are equal round and reactive, EOMI, Conjunctiva normal, No discharge.   Neck: Normal range of motion, No " tenderness, Supple, No stridor. No meningismus.  Lymphatic: No lymphadenopathy noted.   Cardiovascular: Hypertensive, tachycardic rate and regular rhythm without murmur rub or gallop.  Thorax & Lungs: Clear breath sounds bilaterally without wheezes, rhonchi or rales. There is no chest wall tenderness.   Abdomen: Soft non-tender non-distended. There is no rebound or guarding. No organomegaly is appreciated. Bowel sounds are normal.  Skin: Normal without rash.   Back: No CVA or spinal tenderness.   Extremities: Intact distal pulses, No edema, No tenderness, No cyanosis, No clubbing. Capillary refill is less than 2 seconds.  Musculoskeletal: Good range of motion in all major joints. No tenderness to palpation or major deformities noted.   Neurologic: Alert & oriented x 3, Normal motor function, Normal sensory function, No focal deficits noted. Reflexes are normal.  Psychiatric: Anxious appearing, Judgment normal, Mood normal. There is no suicidal ideation or patient reported hallucinations.     DIAGNOSTIC STUDIES / PROCEDURES    LABS  Results for orders placed or performed during the hospital encounter of 10/22/22   CBC WITH DIFFERENTIAL   Result Value Ref Range    WBC 11.0 (H) 4.8 - 10.8 K/uL    RBC 5.25 4.70 - 6.10 M/uL    Hemoglobin 15.2 14.0 - 18.0 g/dL    Hematocrit 44.8 42.0 - 52.0 %    MCV 85.3 81.4 - 97.8 fL    MCH 29.0 27.0 - 33.0 pg    MCHC 33.9 33.7 - 35.3 g/dL    RDW 42.4 35.9 - 50.0 fL    Platelet Count 252 164 - 446 K/uL    MPV 10.2 9.0 - 12.9 fL    Neutrophils-Polys 64.80 44.00 - 72.00 %    Lymphocytes 25.10 22.00 - 41.00 %    Monocytes 7.10 0.00 - 13.40 %    Eosinophils 2.00 0.00 - 6.90 %    Basophils 0.50 0.00 - 1.80 %    Immature Granulocytes 0.50 0.00 - 0.90 %    Nucleated RBC 0.00 /100 WBC    Neutrophils (Absolute) 7.11 1.82 - 7.42 K/uL    Lymphs (Absolute) 2.76 1.00 - 4.80 K/uL    Monos (Absolute) 0.78 0.00 - 0.85 K/uL    Eos (Absolute) 0.22 0.00 - 0.51 K/uL    Baso (Absolute) 0.05 0.00 - 0.12 K/uL     Immature Granulocytes (abs) 0.06 0.00 - 0.11 K/uL    NRBC (Absolute) 0.00 K/uL   COMP METABOLIC PANEL   Result Value Ref Range    Sodium 135 135 - 145 mmol/L    Potassium 3.8 3.6 - 5.5 mmol/L    Chloride 101 96 - 112 mmol/L    Co2 22 20 - 33 mmol/L    Anion Gap 12.0 7.0 - 16.0    Glucose 111 (H) 65 - 99 mg/dL    Bun 15 8 - 22 mg/dL    Creatinine 0.85 0.50 - 1.40 mg/dL    Calcium 9.1 8.5 - 10.5 mg/dL    AST(SGOT) 24 12 - 45 U/L    ALT(SGPT) 51 (H) 2 - 50 U/L    Alkaline Phosphatase 125 (H) 30 - 99 U/L    Total Bilirubin 0.3 0.1 - 1.5 mg/dL    Albumin 4.4 3.2 - 4.9 g/dL    Total Protein 7.6 6.0 - 8.2 g/dL    Globulin 3.2 1.9 - 3.5 g/dL    A-G Ratio 1.4 g/dL   LIPASE   Result Value Ref Range    Lipase 22 11 - 82 U/L   TROPONIN   Result Value Ref Range    Troponin T <6 6 - 19 ng/L   ESTIMATED GFR   Result Value Ref Range    GFR (CKD-EPI) 121 >60 mL/min/1.73 m 2   EKG (NOW)   Result Value Ref Range    Report       [facility] Emergency Dept.    Test Date:  2022-10-22  Pt Name:    MARLYN PERLA           Department: ER  MRN:        [id number]                      Room:  Gender:     Male                         Technician: [id number]  :        1995                   Requested By:ER TRIAGE PROTOCOL  Order #:    [id number]                    Reading MD: Scott Pantoja    Measurements  Intervals                                Axis  Rate:       105                          P:          56  TX:         161                          QRS:        43  QRSD:       94                           T:          45  QT:         318  QTc:        421    Interpretive Statements  Sinus tachycardia  ST elev, probable normal early repol pattern  Compared to ECG 2022 00:31:46  ST (T wave) deviation now present  Electronically Signed On 10- 0:38:05 PDT by Scott Pantoja        All labs reviewed by me. Significant for no leukocytosis, no anemia, normal electrolytes, normal renal function, normal liver  enzymes, normal bilirubin, lipase normal, troponin negative    RADIOLOGY  DX-CHEST-PORTABLE (1 VIEW)   Final Result      No acute cardiopulmonary abnormality.        The radiologist's interpretation of all radiological studies have been reviewed by me.    COURSE & MEDICAL DECISION MAKING  Nursing notes, VS, PMSFHx, labs, imaging, EKG reviewed in chart.    Heart Score: Low    MDM: 11:31 PM Yohannes Lewis is a 26 y.o. male who presented with about a week of chest pain.  Last month he had another episode that was similar and was worked up in the ED and that was negative and sent home for outpatient follow-up.  He has appointments next month with cardiology and PCP.  Of note, his mother did pass away about 5 months ago and he has been in bereavement and grieving and has now developed an anxiety disorder likely.  He feels like he is having palpitations, but he did drink a moderate mount of alcohol today said but does not drink on a daily basis.  I think he is likely dehydrated.  He has no other signs of pulmonary embolus.  His troponin is negative, his EKG is without ischemia, chest x-ray within normal limits and CMP and CBC are normal.  He was treated with 1 dose of Ativan and IV fluids here in the ED and his vital signs improved significantly.  He is appropriate for outpatient follow-up and discharge.  We will send him with a prescription for Atarax.  I counseled him for 4 minutes regarding likely diagnosis of anxiety but need for further work-up.  Recommended that he see a therapist to help develop strategies to help control his anxiety and process his grieving.  He verbalized understanding this and my return precautions and is amenable.    FINAL IMPRESSION  1. Chest pain, unspecified type Acute   2. Anxiety Acute       Maritza BRENNER (Mikayla), am scribing for, and in the presence of, Scott Pantoja.    Electronically signed by: Maritza Vila (Mikayla), 10/22/2022    Scott BRENNER personally  performed the services described in this documentation, as scribed by Maritza Vila in my presence, and it is both accurate and complete.    The note accurately reflects work and decisions made by me.  Scott Pantoja  10/23/2022  12:40 AM

## 2022-10-31 ENCOUNTER — OFFICE VISIT (OUTPATIENT)
Dept: CARDIOLOGY | Facility: MEDICAL CENTER | Age: 27
End: 2022-10-31
Payer: COMMERCIAL

## 2022-10-31 VITALS
DIASTOLIC BLOOD PRESSURE: 80 MMHG | HEART RATE: 86 BPM | SYSTOLIC BLOOD PRESSURE: 130 MMHG | OXYGEN SATURATION: 97 % | BODY MASS INDEX: 43.96 KG/M2 | WEIGHT: 314 LBS | RESPIRATION RATE: 16 BRPM | HEIGHT: 71 IN

## 2022-10-31 DIAGNOSIS — R42 LIGHTHEADEDNESS: ICD-10-CM

## 2022-10-31 DIAGNOSIS — R29.818 SUSPECTED SLEEP APNEA: ICD-10-CM

## 2022-10-31 DIAGNOSIS — R00.0 SINUS TACHYCARDIA: ICD-10-CM

## 2022-10-31 PROCEDURE — 99204 OFFICE O/P NEW MOD 45 MIN: CPT | Performed by: INTERNAL MEDICINE

## 2022-10-31 ASSESSMENT — ENCOUNTER SYMPTOMS
BACK PAIN: 0
COUGH: 0
SHORTNESS OF BREATH: 1
DIZZINESS: 1
NUMBNESS: 0
NAUSEA: 1
HEADACHES: 0
WHEEZING: 0
VOMITING: 0
DECREASED APPETITE: 0
WEAKNESS: 0
DIARRHEA: 0
LIGHT-HEADEDNESS: 0
IRREGULAR HEARTBEAT: 0
BLURRED VISION: 0
DIAPHORESIS: 0
DOUBLE VISION: 0
NEAR-SYNCOPE: 0
SORE THROAT: 0
PND: 0
FLANK PAIN: 0
FALLS: 0
MYALGIAS: 0
PALPITATIONS: 0
SLEEP DISTURBANCES DUE TO BREATHING: 0
LOSS OF BALANCE: 0
HEARTBURN: 1
BLOATING: 0
PARESTHESIAS: 0
EXCESSIVE DAYTIME SLEEPINESS: 0
DYSPNEA ON EXERTION: 0
ORTHOPNEA: 0
FEVER: 0
NIGHT SWEATS: 0
CONSTIPATION: 0
SYNCOPE: 0

## 2022-10-31 ASSESSMENT — FIBROSIS 4 INDEX: FIB4 SCORE: 0.37

## 2022-10-31 NOTE — PROGRESS NOTES
Cardiology Initial Consultation Note    Date of note:    10/31/2022    Primary Care Provider: Cinthia Paredes P.A.-C.  Referring Provider: Cinthia Paredes P.A.-C.    Patient Name: Yohannes Lewis     YOB: 1995  MRN:              [id number]    Chief Complaint   Patient presents with    Tachycardia       History of Present Illness: Mr. Yohannes Lewis is a 26 y.o. male whose current medical problems include morbid obesity with BMI 43.8 who is here for cardiac consultation for dyspnea.    Patient states that his mother passed away in May 2022 after having a surgery.  She was in [place] and he believes that she had a heart attack.  Was in his usual state of health until 2022 when he started getting episodes of feelings of passing out with lying down before going to sleep.  Does feel lightheadedness with it but no chest pain or pressure.  Denies symptoms of orthopnea, PND.  Went to the ER on 2022 and 10/22/2022 for similar symptoms.  Cardiac work-up was unremarkable.  EKG during both visits showed sinus tachycardia.    In terms of physical activity, is active at work.  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  Does feel winded and palpitations with going up 1 flight of stairs.  Weight 267 lbs prior to COVID.    Cardiovascular Risk Factors:  1. Smoking status: Current smoker  2. Type II Diabetes Mellitus: no   3. Hypertension: no  4. Dyslipidemia: no   Cholesterol,Tot   Date Value Ref Range Status   2015 166 100 - 199 mg/dL Final     LDL   Date Value Ref Range Status   2015 98 <100 mg/dL Final     HDL   Date Value Ref Range Status   2015 39 (A) >=40 mg/dL Final     Triglycerides   Date Value Ref Range Status   2015 145 0 - 149 mg/dL Final     5. Family history of early Coronary Artery Disease in a first degree relative (Male less than 55 years of age; Female less than 65 years of age): Mother had heart attack and  of MI at the age of  64  6.  Obesity and/or Metabolic Syndrome: BMI 43.8      Review of Systems   Constitutional: Negative for decreased appetite, diaphoresis, fever, malaise/fatigue and night sweats.   HENT:  Negative for congestion and sore throat.    Eyes:  Negative for blurred vision and double vision.   Cardiovascular:  Positive for chest pain. Negative for cyanosis, dyspnea on exertion, irregular heartbeat, leg swelling, near-syncope, orthopnea, palpitations, paroxysmal nocturnal dyspnea and syncope.   Respiratory:  Positive for shortness of breath. Negative for cough, sleep disturbances due to breathing and wheezing.    Endocrine: Negative for cold intolerance and heat intolerance.   Musculoskeletal:  Negative for back pain, falls and myalgias.   Gastrointestinal:  Positive for heartburn and nausea. Negative for bloating, constipation, diarrhea and vomiting.   Genitourinary:  Negative for dysuria and flank pain.   Neurological:  Positive for dizziness. Negative for excessive daytime sleepiness, headaches, light-headedness, loss of balance, numbness, paresthesias and weakness.       Past Medical History:   Diagnosis Date    Achilles rupture, left 11/11/2021    Pt. states from soccer injury    Hernia of testicle     Patient denies medical problems          Past Surgical History:   Procedure Laterality Date    PB REPAIR ACHILLES TENDON,PBIMARY Left 11/15/2021    Procedure: ACHILLES REPAIR;  Surgeon: Zach Guerrero M.D.;  Location: SURGERY [facility];  Service: Orthopedics    HERNIA REPAIR           Current Outpatient Medications   Medication Sig Dispense Refill    hydrOXYzine HCl (ATARAX) 25 MG Tab Take 1 Tablet by mouth 3 times a day as needed for Anxiety. 30 Tablet 0     No current facility-administered medications for this visit.         No Known Allergies      Family History   Problem Relation Age of Onset    Heart Disease Mother          Social History     Socioeconomic History    Marital status: Single     Spouse name: Not  "on file    Number of children: Not on file    Years of education: Not on file    Highest education level: Not on file   Occupational History    Not on file   Tobacco Use    Smoking status: Some Days     Types: Cigarettes     Last attempt to quit: 11/15/2020     Years since quittin.9    Smokeless tobacco: Never   Vaping Use    Vaping Use: Never used   Substance and Sexual Activity    Alcohol use: Yes     Comment: occ    Drug use: Never    Sexual activity: Not on file   Other Topics Concern    Not on file   Social History Narrative    Not on file     Social Determinants of Health     Financial Resource Strain: Not on file   Food Insecurity: Not on file   Transportation Needs: Not on file   Physical Activity: Not on file   Stress: Not on file   Social Connections: Not on file   Intimate Partner Violence: Not on file   Housing Stability: Not on file         Physical Exam:  Ambulatory Vitals  /80 (BP Location: Left arm, Patient Position: Sitting, BP Cuff Size: Adult)   Pulse 86   Resp 16   Ht 1.803 m (5' 11\")   Wt (!) 142 kg (314 lb)   SpO2 97%    Oxygen Therapy:  Pulse Oximetry: 97 %  BP Readings from Last 4 Encounters:   10/31/22 130/80   10/23/22 119/56   22 139/71   11/15/21 155/57       Weight/BMI: Body mass index is 43.79 kg/m².  Wt Readings from Last 4 Encounters:   10/31/22 (!) 142 kg (314 lb)   10/22/22 (!) 142 kg (312 lb)   22 (!) 142 kg (312 lb 9.8 oz)   11/15/21 (!) 135 kg (298 lb 8.1 oz)         General: Well appearing and in no apparent distress  Eyes: nl conjunctiva, no icteric sclera  ENT: wearing a mask, normal external appearance of ears  Neck: no visible JVP,  no carotid bruits  Lungs: normal respiratory effort, CTAB  Heart: RRR, no murmurs, no rubs or gallops,  no edema bilateral lower extremities. No LV/RV heave on cardiac palpatation. 2+ bilateral radial pulses.  2+ bilateral dp pulses.   Abdomen: soft, non tender, non distended, no masses, normal bowel sounds.  No " HSM.  Extremities/MSK: no clubbing, no cyanosis  Neurological: No focal sensory deficits  Psychiatric: Appropriate affect, A/O x 3, intact judgement and insight  Skin: Warm extremities      Lab Data Review:  Lab Results   Component Value Date/Time    CHOLSTRLTOT 166 01/13/2015 09:19 AM    LDL 98 01/13/2015 09:19 AM    HDL 39 (A) 01/13/2015 09:19 AM    TRIGLYCERIDE 145 01/13/2015 09:19 AM       Lab Results   Component Value Date/Time    SODIUM 135 10/22/2022 11:43 PM    POTASSIUM 3.8 10/22/2022 11:43 PM    CHLORIDE 101 10/22/2022 11:43 PM    CO2 22 10/22/2022 11:43 PM    GLUCOSE 111 (H) 10/22/2022 11:43 PM    BUN 15 10/22/2022 11:43 PM    CREATININE 0.85 10/22/2022 11:43 PM     Lab Results   Component Value Date/Time    ALKPHOSPHAT 125 (H) 10/22/2022 11:43 PM    ASTSGOT 24 10/22/2022 11:43 PM    ALTSGPT 51 (H) 10/22/2022 11:43 PM    TBILIRUBIN 0.3 10/22/2022 11:43 PM      Lab Results   Component Value Date/Time    WBC 11.0 (H) 10/22/2022 11:43 PM     No results found for: HBA1C      Cardiac Imaging and Procedures Review:    EKG dated 10/22/2022: My personal interpretation is sinus tachycardia    EKG dated 9/30/2022: My personal interpretation is sinus tachycardia      Radiology test Review:  CXR: No acute cardiopulmonary abnormality noted        Assessment & Plan     1. Lightheadedness  OVERNIGHT PULSE OXIMETRY      2. Sinus tachycardia        3. Suspected sleep apnea  OVERNIGHT PULSE OXIMETRY            Shared Medical Decision Making:  We discussed his ongoing symptoms in extensive detail.  His symptoms are likely related to ongoing grief after passing of his mother.  I do suspect sleep apnea given recent weight gain.  Obtain overnight pulse oximeter to rule out sleep apnea.  If abnormal, referral to sleep medicine for sleep study and CPAP consideration which has been d/w the patient.  We also discussed cardiovascular complications such as elevated blood pressure, heart attacks, cardiac arrhythmias, and strokes with  untreated sleep apnea.    For palpitations and dyspnea with going up a flight of stairs is likely from cardiac deconditioning.  He has not worked out since COVID and has gained significant amount of weight.    Discussed the importance of lifestyle modifications to lower CVD risk. This includes eating a heart-healthy Mediterranean diet (avoid processed, frozen, pre-packed meals), regular cardio focused exercise where your heart rate is reaching 85% of your maximum predicted heart rate (calculated as 220 minus your age) for a minimum of 150 minutes/week, maintenance of normal BMI, and avoidance of tobacco products.      All of patient's excellent questions were answered to the best of my knowledge and to his satisfaction.  It was a pleasure seeing Mr. Yohannes Lewis in my clinic today. Return if symptoms worsen or fail to improve. Patient is aware to call the cardiology clinic with any questions or concerns.      Sameer Lanier MD  [facility]  [facility] Medicine, [address]  [address] [address]  Phone: [phone]  Fax: [phone]    Please note that this dictation was created using voice recognition software. I have made every reasonable attempt to correct obvious errors, but it is possible there are errors of grammar and possibly content that I did not discover before finalizing the note.

## 2022-11-07 ENCOUNTER — TELEPHONE (OUTPATIENT)
Dept: CARDIOLOGY | Facility: MEDICAL CENTER | Age: 27
End: 2022-11-07
Payer: COMMERCIAL

## 2022-11-07 NOTE — TELEPHONE ENCOUNTER
Faxed OPO order to [facility], [place]. Received conformation and scan into [employer].

## 2023-01-03 ENCOUNTER — HOSPITAL ENCOUNTER (EMERGENCY)
Facility: MEDICAL CENTER | Age: 28
End: 2023-01-03
Attending: EMERGENCY MEDICINE
Payer: COMMERCIAL

## 2023-01-03 VITALS
OXYGEN SATURATION: 97 % | BODY MASS INDEX: 43.55 KG/M2 | HEIGHT: 71 IN | DIASTOLIC BLOOD PRESSURE: 95 MMHG | SYSTOLIC BLOOD PRESSURE: 135 MMHG | TEMPERATURE: 98 F | RESPIRATION RATE: 18 BRPM | HEART RATE: 90 BPM | WEIGHT: 311.07 LBS

## 2023-01-03 DIAGNOSIS — J06.9 UPPER RESPIRATORY TRACT INFECTION, UNSPECIFIED TYPE: ICD-10-CM

## 2023-01-03 PROCEDURE — 99282 EMERGENCY DEPT VISIT SF MDM: CPT

## 2023-01-03 ASSESSMENT — FIBROSIS 4 INDEX: FIB4 SCORE: 0.39

## 2023-01-03 ASSESSMENT — LIFESTYLE VARIABLES: DO YOU DRINK ALCOHOL: NO

## 2023-01-03 NOTE — ED NOTES
Discharge instructions and follow-up care reviewed with patient. All questions answered and patient verbalized understanding. Vss. Work note provided. Patient stable and ambulatory upon d/c from ED.

## 2023-01-03 NOTE — DISCHARGE INSTRUCTIONS
Follow-up with primary care this week for reevaluation, medication management and close blood pressure monitoring.    Continue use of over-the-counter medications as needed for relief of cold and flu symptoms.    Encourage oral fluid hydration.  Diet and activity as tolerated.    Return to the emergency department for intractable fever, difficulty breathing/wheezing/retractions, chest pain, sore throat/neck pain or stiffness, vomiting or other new concerns.

## 2023-01-03 NOTE — ED PROVIDER NOTES
"ED Provider Note        CHIEF COMPLAINT  Chief Complaint   Patient presents with    Letter for School/Work     Has had cold s/sx x 3-5 days. Work requesting he get a work note.        EXTERNAL RECORDS REVIEWED  Select: Other not applicable; prior chest pain evaluations were unremarkable presents with an acute illness;      [facility]  LIMITATION TO HISTORY   Select: : None  OUTSIDE HISTORIAN(S):  None    Yohannes Lewis is a 27 y.o. male who presents to the emergency department through triage requesting a work note.  Patient states he had approximately 1 week of cold symptoms, but is feeling much better and wishes to return to work.  His work is requiring a work note from physician for return.    Patient states approximately 8 days ago he developed sore throat, cough.  He had \"half a day\" of tactile fever and chills.  No persistent sore throat.  Nasal congestion has resolved.  No difficulty breathing, wheezing.  No productive sputum.  Minimal cough at this time.  No shortness of breath, chest pain or syncope.  No posttussive emesis or other vomiting.  Suspect sick contacts at work.  Denies travel.  OTC medications were effective for relief of symptoms.    REVIEW OF SYSTEMS  See HPI for further details. All other systems are negative.     PAST MEDICAL HISTORY   has a past medical history of Achilles rupture, left (2021), Hernia of testicle, and Patient denies medical problems.    SURGICAL HISTORY   has a past surgical history that includes hernia repair and repair achilles tendon,primary (Left, 11/15/2021).    FAMILY HISTORY  Family History   Problem Relation Age of Onset    Heart Disease Mother        SOCIAL HISTORY  Social History     Tobacco Use    Smoking status: Some Days     Types: Cigarettes     Last attempt to quit: 11/15/2020     Years since quittin.1    Smokeless tobacco: Never   Vaping Use    Vaping Use: Never used   Substance and Sexual Activity    Alcohol use: Yes     Comment: occ    Drug use: " "Never    Sexual activity: Not on file       CURRENT MEDICATIONS  Home Medications       Reviewed by J Luis Roberto R.N. (Registered Nurse) on 01/03/23 at 0714  Med List Status: Partial     Medication Last Dose Status   hydrOXYzine HCl (ATARAX) 25 MG Tab  Active                    ALLERGIES  No Known Allergies    PHYSICAL EXAM  VITAL SIGNS: BP (!) 142/103   Pulse (!) 106   Temp 37 °C (98.6 °F) (Oral)   Resp 18   Ht 1.803 m (5' 11\")   Wt (!) 141 kg (311 lb 1.1 oz)   SpO2 96%   BMI 43.39 kg/m²    Pulse ox interpretation: I interpret this pulse ox as normal.  Constitutional: Alert in no apparent distress.  HENT: Normocephalic, atraumatic. Bilateral external ears normal, Nose normal. Moist mucous membranes.  No tenderness or fullness over maxillary frontal sinuses.  Oropharynx within normal limits, no erythema, edema or exudate.  Tolerating secretions.  No stridor or dysphonia.  Eyes: Pupils are equal and reactive, Conjunctiva normal.  No conjunctivitis.  Neck: Normal range of motion, Supple.  No meningeal irritation.  Lymphatic: No lymphadenopathy noted.  No cervical or submandibular lymphadenopathy.  Cardiovascular: Regular rate and rhythm, no murmurs. Distal pulses intact.    Thorax & Lungs: Normal breath sounds.  No wheezing/rales/ronchi. No increased work of breathing, clipped speech or retractions.   Skin: Warm, Dry, No erythema, No rash.   Musculoskeletal: Good range of motion in all major joints. No major deformities noted.   Neurologic: Alert and orient x4.  Ambulates independently.  Psychiatric: Affect normal, Judgment normal, Mood normal.     COURSE & MEDICAL DECISION MAKING  INITIAL ASSESSMENT AND PLAN  Patient seen evaluated bedside.  Describes resolving symptoms of recent URI.  Requesting a work note to return per his administration.  Mild persistent nonproductive cough, states sore throat congestion fever and chills have all resolved.  No shortness of breath.  No pleuritic chest pain.  No longer " taking OTC medications.    Physical exam is unremarkable.  No clinical evidence for pharyngitis, sinusitis, meningitis or pneumonia.  Hemodynamically stable, tachycardia improved without other treatment in the emergency department.  No fever, hypotension or hypoxia.    HTN/IDDM FOLLOW UP:  The patient is referred to a primary physician for blood pressure management, diabetic screening, and for all other preventive health concerns@     Diagnostic tests and prescription drugs considered including, but not limited to: Select: Antibiotics , imaging.  Respiratory symptoms resolving, history is consistent with a viral illness for which antibiotics are not indicated.  No clinical evidence for pneumonia, respiratory distress, no indication for chest x-ray..     FINAL PROBLEM LIST AND DISPOSITION  Evaluation most consistent with resolving viral upper respiratory infection.  Exam is unremarkable.  No clinical evidence for pharyngitis, sinusitis, meningitis or pneumonia.  Hemodynamically stable, tachycardia on arrival improved without other ED intervention.  He was never hypotensive or hypoxic.  Clinically well-appearing and nontoxic.  A work note has been provided, given the patient is afebrile and symptoms have nearly resolved return to work is appropriate at this time, unless this contraindicates independent work policy.    In addition to the chief complaint, the following problems were addressed: Hypertension, referred to primary care for follow-up    Patient is stable for discharge home, anticipatory guidance provided, can continue OTC medications as needed for residual symptoms, close primary care follow-up is encouraged and strict ED return instructions have been detailed.  He is aware of the findings and agreeable to the disposition plan.      FINAL IMPRESSION  1. Upper respiratory tract infection, unspecified type           Electronically signed by: Ping Barclay D.O., 1/3/2023 8:19 AM

## 2023-01-03 NOTE — Clinical Note
Yohannes Lewis was seen and treated in our emergency department on 1/3/2023.  He may return to work on 01/03/2023.       If you have any questions or concerns, please don't hesitate to call.      Ping Barclay D.O.

## 2023-01-03 NOTE — ED TRIAGE NOTES
"Chief Complaint   Patient presents with    Letter for School/Work     Has had cold s/sx x 3-5 days. Work requesting he get a work note.      Ambulatory to triage for above. HR rechecked and improved from check in.    BP (!) 142/103   Pulse (!) 106   Temp 37 °C (98.6 °F) (Oral)   Resp 18   Ht 1.803 m (5' 11\")   Wt (!) 141 kg (311 lb 1.1 oz)   SpO2 96%   BMI 43.39 kg/m²     "

## 2023-11-18 ENCOUNTER — HOSPITAL ENCOUNTER (EMERGENCY)
Facility: MEDICAL CENTER | Age: 28
End: 2023-11-18
Attending: EMERGENCY MEDICINE
Payer: COMMERCIAL

## 2023-11-18 ENCOUNTER — APPOINTMENT (OUTPATIENT)
Dept: RADIOLOGY | Facility: MEDICAL CENTER | Age: 28
End: 2023-11-18
Attending: EMERGENCY MEDICINE
Payer: COMMERCIAL

## 2023-11-18 VITALS
OXYGEN SATURATION: 96 % | DIASTOLIC BLOOD PRESSURE: 63 MMHG | HEIGHT: 71 IN | SYSTOLIC BLOOD PRESSURE: 128 MMHG | HEART RATE: 74 BPM | BODY MASS INDEX: 44.1 KG/M2 | TEMPERATURE: 97.5 F | WEIGHT: 315 LBS | RESPIRATION RATE: 16 BRPM

## 2023-11-18 DIAGNOSIS — R07.9 CHEST PAIN, UNSPECIFIED TYPE: Primary | ICD-10-CM

## 2023-11-18 DIAGNOSIS — F41.9 ANXIETY: ICD-10-CM

## 2023-11-18 LAB
ALBUMIN SERPL BCP-MCNC: 4.2 G/DL (ref 3.2–4.9)
ALBUMIN/GLOB SERPL: 1.4 G/DL
ALP SERPL-CCNC: 120 U/L (ref 30–99)
ALT SERPL-CCNC: 63 U/L (ref 2–50)
ANION GAP SERPL CALC-SCNC: 11 MMOL/L (ref 7–16)
AST SERPL-CCNC: 24 U/L (ref 12–45)
BASOPHILS # BLD AUTO: 0.6 % (ref 0–1.8)
BASOPHILS # BLD: 0.05 K/UL (ref 0–0.12)
BILIRUB SERPL-MCNC: 0.2 MG/DL (ref 0.1–1.5)
BUN SERPL-MCNC: 15 MG/DL (ref 8–22)
CALCIUM ALBUM COR SERPL-MCNC: 8.5 MG/DL (ref 8.5–10.5)
CALCIUM SERPL-MCNC: 8.7 MG/DL (ref 8.5–10.5)
CHLORIDE SERPL-SCNC: 104 MMOL/L (ref 96–112)
CO2 SERPL-SCNC: 22 MMOL/L (ref 20–33)
CREAT SERPL-MCNC: 0.66 MG/DL (ref 0.5–1.4)
EKG IMPRESSION: NORMAL
EOSINOPHIL # BLD AUTO: 0.33 K/UL (ref 0–0.51)
EOSINOPHIL NFR BLD: 3.8 % (ref 0–6.9)
ERYTHROCYTE [DISTWIDTH] IN BLOOD BY AUTOMATED COUNT: 42.5 FL (ref 35.9–50)
GFR SERPLBLD CREATININE-BSD FMLA CKD-EPI: 129 ML/MIN/1.73 M 2
GLOBULIN SER CALC-MCNC: 3 G/DL (ref 1.9–3.5)
GLUCOSE SERPL-MCNC: 115 MG/DL (ref 65–99)
HCT VFR BLD AUTO: 42.8 % (ref 42–52)
HGB BLD-MCNC: 14 G/DL (ref 14–18)
IMM GRANULOCYTES # BLD AUTO: 0.05 K/UL (ref 0–0.11)
IMM GRANULOCYTES NFR BLD AUTO: 0.6 % (ref 0–0.9)
LYMPHOCYTES # BLD AUTO: 2.97 K/UL (ref 1–4.8)
LYMPHOCYTES NFR BLD: 34.5 % (ref 22–41)
MCH RBC QN AUTO: 28.3 PG (ref 27–33)
MCHC RBC AUTO-ENTMCNC: 32.7 G/DL (ref 32.3–36.5)
MCV RBC AUTO: 86.6 FL (ref 81.4–97.8)
MONOCYTES # BLD AUTO: 0.69 K/UL (ref 0–0.85)
MONOCYTES NFR BLD AUTO: 8 % (ref 0–13.4)
NEUTROPHILS # BLD AUTO: 4.53 K/UL (ref 1.82–7.42)
NEUTROPHILS NFR BLD: 52.5 % (ref 44–72)
NRBC # BLD AUTO: 0 K/UL
NRBC BLD-RTO: 0 /100 WBC (ref 0–0.2)
PLATELET # BLD AUTO: 220 K/UL (ref 164–446)
PMV BLD AUTO: 10.4 FL (ref 9–12.9)
POTASSIUM SERPL-SCNC: 3.6 MMOL/L (ref 3.6–5.5)
PROT SERPL-MCNC: 7.2 G/DL (ref 6–8.2)
RBC # BLD AUTO: 4.94 M/UL (ref 4.7–6.1)
SODIUM SERPL-SCNC: 137 MMOL/L (ref 135–145)
TROPONIN T SERPL-MCNC: <6 NG/L (ref 6–19)
WBC # BLD AUTO: 8.6 K/UL (ref 4.8–10.8)

## 2023-11-18 PROCEDURE — 99283 EMERGENCY DEPT VISIT LOW MDM: CPT

## 2023-11-18 PROCEDURE — 93005 ELECTROCARDIOGRAM TRACING: CPT

## 2023-11-18 PROCEDURE — 93005 ELECTROCARDIOGRAM TRACING: CPT | Performed by: EMERGENCY MEDICINE

## 2023-11-18 PROCEDURE — 80053 COMPREHEN METABOLIC PANEL: CPT

## 2023-11-18 PROCEDURE — 71045 X-RAY EXAM CHEST 1 VIEW: CPT

## 2023-11-18 PROCEDURE — 84484 ASSAY OF TROPONIN QUANT: CPT

## 2023-11-18 PROCEDURE — 36415 COLL VENOUS BLD VENIPUNCTURE: CPT

## 2023-11-18 PROCEDURE — 85025 COMPLETE CBC W/AUTO DIFF WBC: CPT

## 2023-11-18 ASSESSMENT — FIBROSIS 4 INDEX: FIB4 SCORE: 0.39

## 2023-11-18 NOTE — DISCHARGE INSTRUCTIONS
Thankfully your labs, chest x-ray and EKG are all normal.  This is very reassuring.  I do not think at your age with no risk factors having cardiac disease is very likely.  I do want to follow-up with your primary team for further evaluation and treatment.  There could be a component of anxiety related to your presentation tonight and I do think you would benefit from following up with your primary team for further evaluation and treatment.  Come back if you have any worsening symptoms.  Thank you for coming in today.

## 2023-11-18 NOTE — ED PROVIDER NOTES
ED Provider Note    Scribed for Scott Pantoja by Kayleen Ruth. 11/18/2023  2:19 AM    Primary care provider: Cinthia Paredes P.A.-C.  Means of arrival: Private vehicle  History obtained from: Patient  History limited by: None    CHIEF COMPLAINT  Chief Complaint   Patient presents with    Chest Pain     Burning chest pain yesterday 4 am, he said he woke up from sleep with this symptoms  At present, he is just experiencing weird feeling in his chest area as claimed    Denied any shortness of breath     EXTERNAL RECORDS REVIEWED  Other none    HPI/ROS  LIMITATION TO HISTORY   Select: : None    HPI  Yohannes Lewis is a 27 y.o. male who presents to the Emergency Department for evaluation of a burning chest pain onset 1 day ago. He describes that his chest pain started last night at around 4 am, which caused him to wake up. Currently, the patient reports that he feels strange in his chest area. The patient states he also has diaphoresis, but denies any cough, fever, nausea or vomiting. No alleviating or exacerbating factors noted. The patient reports that he smokes tobacco.         REVIEW OF SYSTEMS  As above, all other systems reviewed and are negative.   See HPI for further details.     PAST MEDICAL HISTORY   has a past medical history of Achilles rupture, left (11/11/2021), Hernia of testicle, and Patient denies medical problems.    SURGICAL HISTORY   has a past surgical history that includes hernia repair and repair achilles tendon,primary (Left, 11/15/2021).    SOCIAL HISTORY  Social History     Tobacco Use    Smoking status: Some Days     Current packs/day: 0.00     Types: Cigarettes     Last attempt to quit: 11/15/2020     Years since quitting: 3.0    Smokeless tobacco: Never   Vaping Use    Vaping Use: Never used   Substance Use Topics    Alcohol use: Yes     Comment: occ    Drug use: Never      Social History     Substance and Sexual Activity   Drug Use Never     FAMILY HISTORY  Family  "History   Problem Relation Age of Onset    Heart Disease Mother      CURRENT MEDICATIONS  Home Medications       Reviewed by Amanda Diggs R.N. (Registered Nurse) on 11/18/23 at 0120  Med List Status: Partial     Medication Last Dose Status   hydrOXYzine HCl (ATARAX) 25 MG Tab  Active                  ALLERGIES  No Known Allergies    PHYSICAL EXAM    VITAL SIGNS:   Vitals:    11/18/23 0111 11/18/23 0114 11/18/23 0210   BP: (!) 155/78  139/65   Pulse: 85  81   Resp: 16  16   Temp: 36.2 °C (97.1 °F)     TempSrc: Temporal     SpO2: 95%  94%   Weight:  (!) 144 kg (317 lb 0.3 oz)    Height:  1.803 m (5' 11\")      Vitals: My interpretation: hypertensive, not tachycardic, afebrile, not hypoxic    Reinterpretation of vitals: Improved and unremarkable at time of discharge    Cardiac Monitor Interpretation: The cardiac monitor revealed normal Sinus Rhythm as interpreted by me. The cardiac monitor was ordered secondary to the patient's history of chest pain and to monitor for dysrhythmia and/or tachycardia.    PE:   Gen: sitting comfortably, speaking clearly, appears in no acute distress   ENT: Mucous membranes moist, posterior pharynx clear, uvula midline, nares patent bilaterally   Neck: Supple, FROM  Pulmonary: Lungs are clear to auscultation bilaterally. No tachypnea  CV:  hypertensive, RRR, no murmur appreciated, pulses 2+ in both upper and lower extremities  Abdomen: soft, NT/ND; no rebound/guarding  : no CVA or suprapubic tenderness   Neuro: A&Ox4 (person, place, time, situation), speech fluent, gait steady, no focal deficits appreciated  Skin: No rash or lesions.  No pallor or jaundice.  No cyanosis.  Warm and dry.     DIAGNOSTIC STUDIES / PROCEDURES    LABS  Results for orders placed or performed during the hospital encounter of 11/18/23   CBC WITH DIFFERENTIAL   Result Value Ref Range    WBC 8.6 4.8 - 10.8 K/uL    RBC 4.94 4.70 - 6.10 M/uL    Hemoglobin 14.0 14.0 - 18.0 g/dL    Hematocrit 42.8 42.0 - 52.0 %    " MCV 86.6 81.4 - 97.8 fL    MCH 28.3 27.0 - 33.0 pg    MCHC 32.7 32.3 - 36.5 g/dL    RDW 42.5 35.9 - 50.0 fL    Platelet Count 220 164 - 446 K/uL    MPV 10.4 9.0 - 12.9 fL    Neutrophils-Polys 52.50 44.00 - 72.00 %    Lymphocytes 34.50 22.00 - 41.00 %    Monocytes 8.00 0.00 - 13.40 %    Eosinophils 3.80 0.00 - 6.90 %    Basophils 0.60 0.00 - 1.80 %    Immature Granulocytes 0.60 0.00 - 0.90 %    Nucleated RBC 0.00 0.00 - 0.20 /100 WBC    Neutrophils (Absolute) 4.53 1.82 - 7.42 K/uL    Lymphs (Absolute) 2.97 1.00 - 4.80 K/uL    Monos (Absolute) 0.69 0.00 - 0.85 K/uL    Eos (Absolute) 0.33 0.00 - 0.51 K/uL    Baso (Absolute) 0.05 0.00 - 0.12 K/uL    Immature Granulocytes (abs) 0.05 0.00 - 0.11 K/uL    NRBC (Absolute) 0.00 K/uL   COMP METABOLIC PANEL   Result Value Ref Range    Sodium 137 135 - 145 mmol/L    Potassium 3.6 3.6 - 5.5 mmol/L    Chloride 104 96 - 112 mmol/L    Co2 22 20 - 33 mmol/L    Anion Gap 11.0 7.0 - 16.0    Glucose 115 (H) 65 - 99 mg/dL    Bun 15 8 - 22 mg/dL    Creatinine 0.66 0.50 - 1.40 mg/dL    Calcium 8.7 8.5 - 10.5 mg/dL    Correct Calcium 8.5 8.5 - 10.5 mg/dL    AST(SGOT) 24 12 - 45 U/L    ALT(SGPT) 63 (H) 2 - 50 U/L    Alkaline Phosphatase 120 (H) 30 - 99 U/L    Total Bilirubin 0.2 0.1 - 1.5 mg/dL    Albumin 4.2 3.2 - 4.9 g/dL    Total Protein 7.2 6.0 - 8.2 g/dL    Globulin 3.0 1.9 - 3.5 g/dL    A-G Ratio 1.4 g/dL   TROPONIN   Result Value Ref Range    Troponin T <6 6 - 19 ng/L   ESTIMATED GFR   Result Value Ref Range    GFR (CKD-EPI) 129 >60 mL/min/1.73 m 2   EKG   Result Value Ref Range    Report       [facility] Emergency Dept.    Test Date:  2023  Pt Name:    MARLYN PERLA           Department: ER  MRN:        [id number]                      Room:  Gender:     Male                         Technician: [id number]  :        1995                   Requested By:ER TRIAGE PROTOCOL  Order #:    [id number]                    Uriel MD: Scott  Kit    Measurements  Intervals                                Axis  Rate:       88                           P:          68  WI:         171                          QRS:        54  QRSD:       89                           T:          59  QT:         347  QTc:        420    Interpretive Statements  Sinus rhythm  Compared to ECG 10/22/2022 23:39:22  Sinus tachycardia no longer present  ST (T wave) deviation no longer present  Electronically Signed On 11- 02:13:10 PST by Scott Pantoja        All labs reviewed by me. Labs were compared to prior labs if they were available. Significant for no leukocytosis, no anemia, normal electrolytes, normal renal function,     RADIOLOGY  I have independently interpreted the diagnostic imaging associated with this visit and am waiting the final reading from the radiologist.   My preliminary interpretation is a follows: No obvious focal consolidation or significant cardiomegaly  Radiologist interpretation is as follows:  DX-CHEST-PORTABLE (1 VIEW)   Final Result      No acute cardiac or pulmonary abnormalities are identified.        COURSE & MEDICAL DECISION MAKING  Nursing notes, VS, PMSFHx, labs, imaging, EKG reviewed in chart.    Heart Score: Low    PERC score: Negative    ED Observation Status? No; Patient does not meet criteria for ED Observation.     Ddx: Anxiety, palpitations, pneumonia, PE, MI    MDM: 2:19 AM Yohannes Lewis is a 27 y.o. male who presented with evaluation for episode of chest pain.  Patient is morbidly obese but otherwise healthy, takes no medications.  States that about 24 hours ago when he was lying in bed he had a brief sudden onset of chest pain that lasted 3 to 4 seconds and then resolved.  He has been anxious about this since then and was trying to sleep tonight but was anxious and came to the ED to be evaluated.  He has not had any recurrence of his chest discomfort.  No shortness of breath, noted with URI symptoms or fevers.  Upon  arrival here his vital signs show borderline hypertension but otherwise are unremarkable.  He is anxious appearing.  EKG was without ischemic changes and normal.  Chest x-ray on my independent interpretation shows no focal consolidative process or significant cardiomegaly, radiologist agrees.  CBC, CMP, troponin negative.  No indication for repeat troponin is chest pain was 24 hours ago.  His heart score is low, he is PERC negative.  He is ambulatory and well-appearing.  Overall very reassuring exam and laboratory work-up here in the ED.  Vital signs normal at time of discharge.  Discussed findings with the patient and he feels relieved.  States he has just been feeling very anxious about it and wanted to be evaluated to make sure nothing was wrong.  Discussed outpatient follow-up with his primary team for further evaluation and treatment.  Patient verbalizes understanding and is amenable.    ADDITIONAL PROBLEM LIST AND DISPOSITION    I have discussed management of the patient with the following physicians and CATALINA's: None    Discussion of management with other QHP or appropriate source(s): None     Escalation of care considered, and ultimately not performed:acute inpatient care management, however at this time, the patient is most appropriate for outpatient management    Decision tools and prescription drugs considered including, but not limited to: Pain Medications Tylenol Motrin as needed .    FINAL IMPRESSION  1. Chest pain, unspecified type Acute   2. Anxiety Acute      IKayleen (Scribe), am scribing for, and in the presence of, Scott Pantoja.    Electronically signed by: Kayleen Ruth (Itzibe), 11/18/2023    IScott personally performed the services described in this documentation, as scribed by Kayleen Ruth in my presence, and it is both accurate and complete.    The note accurately reflects work and decisions made by me.  Scott WILLINGHAM  Kit  11/18/2023  3:03 AM

## 2023-11-18 NOTE — ED NOTES
PT WHEELED TO ROOM. PT STATES HE HAD CHEST PAIN THAT AWOKE HIM FROM SLEEP AT 0400 ON 11.17.23, PT DENIES ANY CURRENT CHEST PAIN BUT STATES IT IS MAKING HIM NERVOUS. PT AMBULATORY TO RESTROOM.     PT IS RESTING IN BED. BREATHING IS EVEN AND UNLABORED, SKIN IS WARM AND DRY, VSS, NAD, WILL CONTINUE TO MONITOR. PT PENDING MD EVALUATION.

## 2023-11-18 NOTE — ED TRIAGE NOTES
Chief Complaint   Patient presents with    Chest Pain     Burning chest pain yesterday 4 am, he said he woke up from sleep with this symptoms  At present, he is just experiencing weird feeling in his chest area as claimed    Denied any shortness of breath       Pain: 0/10    Pt came in to triage ambulatory with steady gait for the above complaints.     Pt is alert and oriented x 4, speaking in full sentences, follows commands and responds appropriately to questions.     Respirations are even and unlabored.    Pt placed in lobby. Pt educated on triage process.     Pt encouraged to inform staff for any changes in condition or if needs help while waiting to be room in.    Vitals:    11/18/23 0111   BP: (!) 155/78   Pulse: 85   Resp: 16   Temp: 36.2 °C (97.1 °F)   SpO2: 95%

## 2024-04-20 ENCOUNTER — HOSPITAL ENCOUNTER (EMERGENCY)
Facility: MEDICAL CENTER | Age: 29
End: 2024-04-20
Attending: EMERGENCY MEDICINE
Payer: COMMERCIAL

## 2024-04-20 ENCOUNTER — APPOINTMENT (OUTPATIENT)
Dept: RADIOLOGY | Facility: MEDICAL CENTER | Age: 29
End: 2024-04-20
Attending: EMERGENCY MEDICINE
Payer: COMMERCIAL

## 2024-04-20 VITALS
SYSTOLIC BLOOD PRESSURE: 137 MMHG | HEART RATE: 94 BPM | WEIGHT: 311.07 LBS | HEIGHT: 71 IN | OXYGEN SATURATION: 95 % | RESPIRATION RATE: 18 BRPM | DIASTOLIC BLOOD PRESSURE: 80 MMHG | BODY MASS INDEX: 43.55 KG/M2 | TEMPERATURE: 97.8 F

## 2024-04-20 DIAGNOSIS — R03.0 ELEVATED BLOOD PRESSURE READING: ICD-10-CM

## 2024-04-20 DIAGNOSIS — M54.9 PAIN, UPPER BACK: ICD-10-CM

## 2024-04-20 DIAGNOSIS — M54.50 LUMBAR BACK PAIN: ICD-10-CM

## 2024-04-20 LAB
APPEARANCE UR: CLEAR
BILIRUB UR QL STRIP.AUTO: NEGATIVE
COLOR UR: YELLOW
GLUCOSE UR STRIP.AUTO-MCNC: NEGATIVE MG/DL
KETONES UR STRIP.AUTO-MCNC: NEGATIVE MG/DL
LEUKOCYTE ESTERASE UR QL STRIP.AUTO: NEGATIVE
MICRO URNS: NORMAL
NITRITE UR QL STRIP.AUTO: NEGATIVE
PH UR STRIP.AUTO: 5 [PH] (ref 5–8)
PROT UR QL STRIP: NEGATIVE MG/DL
RBC UR QL AUTO: NEGATIVE
SP GR UR STRIP.AUTO: 1.02
UROBILINOGEN UR STRIP.AUTO-MCNC: 0.2 MG/DL

## 2024-04-20 PROCEDURE — 99283 EMERGENCY DEPT VISIT LOW MDM: CPT

## 2024-04-20 PROCEDURE — 71046 X-RAY EXAM CHEST 2 VIEWS: CPT

## 2024-04-20 PROCEDURE — 81003 URINALYSIS AUTO W/O SCOPE: CPT

## 2024-04-20 PROCEDURE — 700101 HCHG RX REV CODE 250: Performed by: EMERGENCY MEDICINE

## 2024-04-20 RX ORDER — LIDOCAINE 4 G/G
1 PATCH TOPICAL EVERY 24 HOURS
Status: DISCONTINUED | OUTPATIENT
Start: 2024-04-20 | End: 2024-04-20 | Stop reason: HOSPADM

## 2024-04-20 RX ORDER — CYCLOBENZAPRINE HCL 10 MG
10 TABLET ORAL 3 TIMES DAILY PRN
Qty: 15 TABLET | Refills: 1 | Status: SHIPPED | OUTPATIENT
Start: 2024-04-20

## 2024-04-20 RX ADMIN — LIDOCAINE 1 PATCH: 4 PATCH TOPICAL at 14:19

## 2024-04-20 ASSESSMENT — FIBROSIS 4 INDEX: FIB4 SCORE: 0.41

## 2024-04-20 NOTE — DISCHARGE INSTRUCTIONS
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  For this reason, we want you to return to the ER for fever, difficulty breathing, increasing pain, weakness, numbness, or other concerns.    Take Tylenol 650 mg every 4 hours as needed for pain    Take ibuprofen 600 mg every 6 hours with food as needed for pain    Take Flexeril as needed for muscle spasm/muscle pain    You may apply lidocaine patches such as Salonpas that you can purchase over-the-counter if the one we placed today helped your discomfort.    Follow-up with your primary care doctor this week.  Please call on Monday for an earlier appointment than June.  Please have your blood pressure rechecked by your doctor as well.

## 2024-04-20 NOTE — ED NOTES
Pt ambulatory to [facility] with steady gait. States started to have low back pain bilaterally approximately 1-2 weeks ago. Was previously drinking a lot of caffeine and energy drinks. Once started to have this discomfort pt switched to water.   Endorses urinary discomfort and frequency x 1 week.     Pt notified about needing urine sample. Urinal placed at bedside. Asked to use call light once able to provide sample so this RN can send

## 2024-04-20 NOTE — ED NOTES
Lido patch applied. Pt continues to rest in [facility] with stable vitals on RA.

## 2024-04-20 NOTE — ED PROVIDER NOTES
ED Provider Note    CHIEF COMPLAINT  Chief Complaint   Patient presents with    Back Pain     The pt reports burning pain in the back that started 1.5 weeks ago. Pain is non-radiating, 6/10. Pt denies flu like symptoms and NVD. But the pt reports dysuria and headache       EXTERNAL RECORDS REVIEWED  Other none    HPI/ROS  LIMITATION TO HISTORY   Select: : None    OUTSIDE HISTORIAN(S):  none    Yohannes Lewis is a 28 y.o. male who presents for evaluation of low and mid back pain.  This pain has been intermittent for the last 10 days.  Prior to the pain onset, he was moving and lifting boxes.  The pain has been more constant for the last 5 days.  He describes it as pressure like initially and now feels like burning.  It is nonradiating.  It is made worse by movement and bending.  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  He also reports right proximal forearm pain.  No elbow or wrist pain.    Patient was concerned that the pain may be related to a problem with his lungs or kidneys.  Patient states he has been making some lifestyle modifications recently such as dietary changes which has helped him in losing 7 pounds in the last couple of months and discontinuing tobacco use.    Patient denies chest pain, weakness, numbness, fever, chills, urinary symptoms, incontinence, trauma.    PAST MEDICAL HISTORY   has a past medical history of Achilles rupture, left (11/11/2021), Hernia of testicle, and Patient denies medical problems.    SURGICAL HISTORY   has a past surgical history that includes hernia repair and repair achilles tendon,primary (Left, 11/15/2021).    FAMILY HISTORY  Family History   Problem Relation Age of Onset    Heart Disease Mother        SOCIAL HISTORY  Social History     Tobacco Use    Smoking status: Some Days     Current packs/day: 0.00     Types: Cigarettes     Last attempt to quit: 11/15/2020     Years since quitting: 3.4    Smokeless tobacco: Never   Vaping Use    Vaping Use:  "Never used   Substance and Sexual Activity    Alcohol use: Yes     Comment: occ    Drug use: Never    Sexual activity: Not on file       CURRENT MEDICATIONS  Home Medications       Reviewed by Vargas Ceja R.N. (Registered Nurse) on 04/20/24 at 1325  Med List Status: Partial     Medication Last Dose Status   hydrOXYzine HCl (ATARAX) 25 MG Tab  Active                    ALLERGIES  No Known Allergies    PHYSICAL EXAM  VITAL SIGNS: /80   Pulse 94   Temp 36.6 °C (97.8 °F) (Temporal)   Resp 18   Ht 1.803 m (5' 11\")   Wt (!) 141 kg (311 lb 1.1 oz)   SpO2 95%   BMI 43.39 kg/m²    General:  WD male with elevated BMI, nontoxic appearing in NAD; A+Ox3; V/S as above; elevated blood pressure  Skin: warm and dry; good color; no rash  HEENT: NCAT; EOMs intact; PERRL; no scleral icterus   Neck: FROM; no stridor, no midline tenderness  Cardiovascular: Regular heart rate and rhythm.  No murmurs, rubs, or gallops; pulses 2+ bilaterally radially  Lungs: No respiratory distress or tachypnea; Clear to auscultation with good air movement bilaterally.  No wheezes, rhonchi, or rales.   Back: No midline back tenderness; patient reports the location of his pain is paraspinal in the thoracic area; no crepitus; no vesicular lesions or rash  Abdomen: soft; NTND; no rebound, guarding, or rigidity.  No organomegaly or pulsatile mass; no CVAT; no mottling  Extremities: ROSE x 4; no e/o trauma; no pedal edema; neg Michelle's  Neurologic: CNs III-XII grossly intact; speech clear  Psychiatric: Appropriate affect, normal mood      EKG/LABS  Results for orders placed or performed during the hospital encounter of 04/20/24   Urinalysis, Culture if Indicated    Specimen: Urine, Clean Catch   Result Value Ref Range    Color Yellow     Character Clear     Specific Gravity 1.025 <1.035    Ph 5.0 5.0 - 8.0    Glucose Negative Negative mg/dL    Ketones Negative Negative mg/dL    Protein Negative Negative mg/dL    Bilirubin Negative Negative    " Urobilinogen, Urine 0.2 Negative    Nitrite Negative Negative    Leukocyte Esterase Negative Negative    Occult Blood Negative Negative    Micro Urine Req see below        I have independently interpreted this EKG    RADIOLOGY/PROCEDURES   I have independently interpreted the diagnostic imaging associated with this visit and am waiting the final reading from the radiologist.   My preliminary interpretation is as follows:  No focal consolidation  Radiologist interpretation:  DX-CHEST-2 VIEWS   Final Result      No active disease.          COURSE & MEDICAL DECISION MAKING    ASSESSMENT, COURSE AND PLAN  Care Narrative: This is a 28-year-old male with no significant past medical history with a significantly elevated BMI who presents for mid and low back pain after moving homes.  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  He has no neurologic symptoms or red flag signs or symptoms with regards to back pain.  I do not suspect zoster.  I suspect a musculoskeletal issue.  I do not suspect epidural abscess or cauda equina syndrome.  Patient was concerned, as he used to be a smoker, that his lungs might be causing his symptoms.  Chest x-ray was obtained and negative.  Urinalysis demonstrated no concern for UTI or renal colic given no blood or other findings.    A lidocaine patch was placed while the patient was here in the ER.  Patient be discharged with this along with Flexeril and recommendations to take Tylenol and/or ibuprofen.  He should follow-up with his PCP.  He is amenable to this plan.  He requested a work note for today.  Return precautions advised.  He relayed the lifestyle changes he is making recently and I encouraged him to continue with his weight loss.  Unfortunately, his mother  recently.  Patient seems to be grieving appropriately.      FINAL DIAGNOSIS  1. Lumbar back pain    2. Pain, upper back    3. Elevated blood pressure reading      Electronically signed by: Elizabeth Fam M.D.,  4/20/2024 1:46 PM

## 2024-04-20 NOTE — ED TRIAGE NOTES
"Chief Complaint   Patient presents with    Back Pain     The pt reports burning pain in the back that started 1.5 weeks ago. Pain is non-radiating, 6/10. Pt denies flu like symptoms and NVD. But the pt reports dysuria and headache       Pt ambulatory to triage. Pt A&Ox4, for the above complaint.     Pt to lobby . Pt educated on alerting staff in changes to condition. Pt verbalized understanding. Protocol dysuria ordered     BP (!) 156/92   Pulse 99   Temp 36.8 °C (98.3 °F) (Temporal)   Resp 18   Ht 1.803 m (5' 11\")   Wt (!) 141 kg (311 lb 1.1 oz)   SpO2 96%   BMI 43.39 kg/m²     "

## 2024-04-20 NOTE — ED NOTES
Reviewed discharge instructions, patient verbalized understanding. States they will schedule follow up appointment if needed. Pt agreeable to  prescriptions from pharmacy and verbalized understanding on how to take medications. Verbalized understanding to not drive or operate heavy machinery while taking narcotics.    Denies further questions at this time. Pt ambulatory out of ER with steady gait.

## 2025-01-03 ENCOUNTER — HOSPITAL ENCOUNTER (EMERGENCY)
Facility: MEDICAL CENTER | Age: 30
End: 2025-01-03
Attending: STUDENT IN AN ORGANIZED HEALTH CARE EDUCATION/TRAINING PROGRAM
Payer: COMMERCIAL

## 2025-01-03 VITALS
HEART RATE: 87 BPM | OXYGEN SATURATION: 98 % | RESPIRATION RATE: 18 BRPM | SYSTOLIC BLOOD PRESSURE: 147 MMHG | WEIGHT: 315 LBS | BODY MASS INDEX: 44.1 KG/M2 | DIASTOLIC BLOOD PRESSURE: 93 MMHG | HEIGHT: 71 IN | TEMPERATURE: 97.7 F

## 2025-01-03 DIAGNOSIS — Z13.9 ENCOUNTER FOR MEDICAL SCREENING EXAMINATION: ICD-10-CM

## 2025-01-03 DIAGNOSIS — K42.9 UMBILICAL HERNIA WITHOUT OBSTRUCTION AND WITHOUT GANGRENE: ICD-10-CM

## 2025-01-03 DIAGNOSIS — I10 ACCELERATED HYPERTENSION: ICD-10-CM

## 2025-01-03 PROCEDURE — 99282 EMERGENCY DEPT VISIT SF MDM: CPT

## 2025-01-03 ASSESSMENT — FIBROSIS 4 INDEX: FIB4 SCORE: 0.43

## 2025-01-03 NOTE — Clinical Note
Yohannes Lewis was seen and treated in our emergency department on 1/3/2025.  He may return to work on 01/04/2025.  MEDICALLY CLEARED TO RETURN TO WORK. NO LIFTING RESTRICTIONS AT THIS TIME.     If you have any questions or concerns, please don't hesitate to call.      Kristi Castillo D.O.

## 2025-01-03 NOTE — ED PROVIDER NOTES
ER Provider Note    Scribed for Kristi Castillo D.o. by Kurt Acuña. 1/3/2025  3:46 AM    Primary Care Provider: Cinthia Paredes P.A.-C.    CHIEF COMPLAINT   Chief Complaint   Patient presents with    Other     EXTERNAL RECORDS REVIEWED  Outpatient Notes office visit for lightheadedness and sinus tachycardia tender 2022    HPI/ROS  LIMITATION TO HISTORY   Select: : None  OUTSIDE HISTORIAN(S):  None    Yohannes Lewis is a 29 y.o. male who presents to the ED for medical clearance for work. Patient explains he was on FMLA for the past 2 weeks due to an umbilical hernia which was preventing him from working. He adds that the primary care provider who he originally saw for this has since resigned, and is thus no longer able to sign the form, which prompted visitation to the ED for a physician signature so that he can return to work. He denies any fevers, diarrhea, or vomiting. He endorses having a normal appetite and fluid intake. Patient endorses currently being in the process of establishing care with a new primary physician. He adds that he has a follow up ultrasound on Monday of next week so that he can schedule a surgery to treat the hernia.     PAST MEDICAL HISTORY  Past Medical History:   Diagnosis Date    Achilles rupture, left 11/11/2021    Pt. states from soccer injury    Hernia of testicle     Patient denies medical problems      SURGICAL HISTORY  Past Surgical History:   Procedure Laterality Date    PB REPAIR ACHILLES TENDON,PBIMARY Left 11/15/2021    Procedure: ACHILLES REPAIR;  Surgeon: Zach Guerrero M.D.;  Location: SURGERY [facility];  Service: Orthopedics    HERNIA REPAIR       FAMILY HISTORY  Family History   Problem Relation Age of Onset    Heart Disease Mother      SOCIAL HISTORY   reports that he has been smoking cigarettes. He has never used smokeless tobacco. He reports current alcohol use. He reports that he does not use drugs.    CURRENT MEDICATIONS  Discharge Medication List as of  "1/3/2025  3:53 AM        CONTINUE these medications which have NOT CHANGED    Details   cyclobenzaprine (FLEXERIL) 10 mg Tab Take 1 Tablet by mouth 3 times a day as needed for Moderate Pain or Muscle Spasms., Disp-15 Tablet, R-1, Normal      hydrOXYzine HCl (ATARAX) 25 MG Tab Take 1 Tablet by mouth 3 times a day as needed for Anxiety., Disp-30 Tablet, R-0, Normal           ALLERGIES  Patient has no known allergies.    PHYSICAL EXAM  BP (!) 154/100   Pulse 92   Temp 36.6 °C (97.8 °F) (Temporal)   Resp 18   Ht 1.803 m (5' 11\")   Wt (!) 146 kg (320 lb 12.3 oz)   SpO2 98%   BMI 44.74 kg/m²     Pulse oximetry interpretation: I interpret the pulse oximetry as normal.  Constitutional: Awake and alert. No acute distress.  Head: NCAT.  HEENT: Normal Conjunctiva.  Neck: Grossly normal range of motion. Airway midline.  Cardiovascular: Normal heart rate, Normal rhythm.  Thorax & Lungs: No respiratory distress. Clear to Auscultation bilaterally.  Abdomen: Right umbilical hernia without skin changes. Nontender. Nondistended  Skin: No obvious rash.  Back: No tenderness, No CVA tenderness.   Musculoskeletal: No obvious deformity. Moves all extremities Well.  Neurologic: A&Ox4.   Psychiatric: Mood and affect are appropriate for situation.    COURSE & MEDICAL DECISION MAKING     ASSESSMENT, COURSE AND PLAN  Care Narrative:     3:53 AM - 29 y.o. YO male presents with an abdominal hernia who is requesting a signature to clear his FMLA so that he can return to work  Afebrile and hypertensive but otherwise normal vitals   Pertinent exam findings include: Right sided umbilical hernia without skin changes or overlying erythema.  Abdomen is soft and benign.    Given patient ER note saying he is cleared to return to work.  Further FMLA related paperwork will need to be from PCP.    ADDITIONAL PROBLEM LIST    Accelerated hypertension    DISPOSITION AND DISCUSSIONS  I have discussed management of the patient with the following " physicians and CATALINA's:  none    Discussion of management with other Q or appropriate source(s): None     Escalation of care considered, and ultimately not performed: acute inpatient care management, however at this time, the patient is most appropriate for outpatient management.    Barriers to care at this time, including but not limited to:  None .     Decision tools and prescription drugs considered including, but not limited to:  None .    FINAL DIANGOSIS  1. Encounter for medical screening examination    2. Umbilical hernia without obstruction and without gangrene    3. Accelerated hypertension      The note accurately reflects work and decisions made by me.  Kristi Castillo D.O.  1/3/2025  5:35 AM

## 2025-01-03 NOTE — ED TRIAGE NOTES
Chief Complaint   Patient presents with    Other       Ambulatory to triage, States he was on FMLA for 2 weeks due to umbilical Hernia apparently the doctor who saw him was no longer there to sign the release form so he could go back to work. Came here to get his release form signed by ERP.    Pt back to lobby, educated on triage process and encourage to alert staff of any changes.     Vitals:    01/03/25 0152   BP: (!) 154/100   Pulse: 92   Resp: 18   Temp: 36.6 °C (97.8 °F)   SpO2: 98%

## (undated) DEVICE — LACTATED RINGERS INJ 1000 ML - (14EA/CA 60CA/PF)

## (undated) DEVICE — PACK LOWER EXTREMITY - (2/CA)

## (undated) DEVICE — GLOVE BIOGEL PI INDICATOR SZ 8.0 SURGICAL PF LF -(50/BX 4BX/CA)

## (undated) DEVICE — DRESSING ABDOMINAL PAD STERILE 8 X 10" (360EA/CA)"

## (undated) DEVICE — PAD LAP STERILE 18 X 18 - (5/PK 40PK/CA)

## (undated) DEVICE — TUBING CLEARLINK DUO-VENT - C-FLO (48EA/CA)

## (undated) DEVICE — NEEDLE MAYO CATGUT TPR POINT - (2EA/PK20PK/BX)

## (undated) DEVICE — GOWN SURGEONS X-LARGE - DISP. (30/CA)

## (undated) DEVICE — KIT ANESTHESIA W/CIRCUIT & 3/LT BAG W/FILTER (20EA/CA)

## (undated) DEVICE — SUCTION INSTRUMENT YANKAUER BULBOUS TIP W/O VENT (50EA/CA)

## (undated) DEVICE — MASK ANESTHESIA ADULT  - (100/CA)

## (undated) DEVICE — PAD PREP 24 X 48 CUFFED - (100/CA)

## (undated) DEVICE — WRAP CO-FLEX 4IN X 5YD STERIL - SELF-ADHERENT (18/CA)

## (undated) DEVICE — PROTECTOR ULNA NERVE - (36PR/CA)

## (undated) DEVICE — SUTURE 3-0 ETHILON FS-1 - (36/BX) 30 INCH

## (undated) DEVICE — TOWEL STOP TIMEOUT SAFETY FLAG (40EA/CA)

## (undated) DEVICE — SLEEVE, VASO, THIGH, MED

## (undated) DEVICE — ELECTRODE DUAL RETURN W/ CORD - (50/PK)

## (undated) DEVICE — PADDING CAST 6 IN STERILE - 6 X 4 YDS (24/CA)

## (undated) DEVICE — STOCKINET BIAS 6 IN STERILE - (20/CA)

## (undated) DEVICE — GLOVE BIOGEL ECLIPSE PF LATEX SIZE 8.0  (50PR/BX)

## (undated) DEVICE — SUTURE 3-0 VICRYL PLUS SH - 8X 18 INCH (12/BX)

## (undated) DEVICE — HEAD HOLDER JUNIOR/ADULT

## (undated) DEVICE — SUTURE GENERAL

## (undated) DEVICE — NEPTUNE 4 PORT MANIFOLD - (20/PK)

## (undated) DEVICE — ELECTRODE 850 FOAM ADHESIVE - HYDROGEL RADIOTRNSPRNT (50/PK)

## (undated) DEVICE — TAPE FIBER #2 (6/BX)

## (undated) DEVICE — SODIUM CHL IRRIGATION 0.9% 1000ML (12EA/CA)

## (undated) DEVICE — DRESSING 3X8 ADAPTIC GAUZE - NON-ADHERING (36/PK 6PK/BX)

## (undated) DEVICE — GOWN WARMING STANDARD FLEX - (30/CA)

## (undated) DEVICE — SET LEADWIRE 5 LEAD BEDSIDE DISPOSABLE ECG (1SET OF 5/EA)

## (undated) DEVICE — SENSOR SPO2 NEO LNCS ADHESIVE (20/BX) SEE USER NOTES

## (undated) DEVICE — SET EXTENSION WITH 2 PORTS (48EA/CA) ***PART #2C8610 IS A SUBSTITUTE*****

## (undated) DEVICE — GLOVE BIOGEL SZ 6.5 SURGICAL PF LTX (50PR/BX 4BX/CA)

## (undated) DEVICE — SPLINT PLASTER 5 IN X 30 IN - (50EA/BX 6BX/CA)

## (undated) DEVICE — CANISTER SUCTION 3000ML MECHANICAL FILTER AUTO SHUTOFF MEDI-VAC NONSTERILE LF DISP  (40EA/CA)